# Patient Record
Sex: MALE | Race: BLACK OR AFRICAN AMERICAN | NOT HISPANIC OR LATINO | Employment: FULL TIME
[De-identification: names, ages, dates, MRNs, and addresses within clinical notes are randomized per-mention and may not be internally consistent; named-entity substitution may affect disease eponyms.]

---

## 2017-01-06 ENCOUNTER — RECORDS - HEALTHEAST (OUTPATIENT)
Dept: ADMINISTRATIVE | Facility: OTHER | Age: 47
End: 2017-01-06

## 2017-02-06 ENCOUNTER — RECORDS - HEALTHEAST (OUTPATIENT)
Dept: ADMINISTRATIVE | Facility: OTHER | Age: 47
End: 2017-02-06

## 2017-02-17 ENCOUNTER — OFFICE VISIT - HEALTHEAST (OUTPATIENT)
Dept: FAMILY MEDICINE | Facility: CLINIC | Age: 47
End: 2017-02-17

## 2017-02-17 DIAGNOSIS — F41.9 ANXIETY DISORDER, UNSPECIFIED: ICD-10-CM

## 2017-03-08 ENCOUNTER — OFFICE VISIT - HEALTHEAST (OUTPATIENT)
Dept: FAMILY MEDICINE | Facility: CLINIC | Age: 47
End: 2017-03-08

## 2017-03-08 DIAGNOSIS — M25.552 LEFT HIP PAIN: ICD-10-CM

## 2017-03-08 DIAGNOSIS — Z00.00 ROUTINE GENERAL MEDICAL EXAMINATION AT A HEALTH CARE FACILITY: ICD-10-CM

## 2017-03-08 DIAGNOSIS — F90.0 ADHD, PREDOMINANTLY INATTENTIVE TYPE: ICD-10-CM

## 2017-03-08 LAB
CHOLEST SERPL-MCNC: 178 MG/DL
FASTING STATUS PATIENT QL REPORTED: YES
HDLC SERPL-MCNC: 50 MG/DL
LDLC SERPL CALC-MCNC: 111 MG/DL
PSA SERPL-MCNC: 0.8 NG/ML (ref 0–2.5)
TRIGL SERPL-MCNC: 84 MG/DL

## 2017-03-08 ASSESSMENT — MIFFLIN-ST. JEOR: SCORE: 1753.6

## 2017-03-24 ENCOUNTER — RECORDS - HEALTHEAST (OUTPATIENT)
Dept: ADMINISTRATIVE | Facility: OTHER | Age: 47
End: 2017-03-24

## 2017-03-27 ENCOUNTER — RECORDS - HEALTHEAST (OUTPATIENT)
Dept: ADMINISTRATIVE | Facility: OTHER | Age: 47
End: 2017-03-27

## 2017-04-11 ENCOUNTER — COMMUNICATION - HEALTHEAST (OUTPATIENT)
Dept: FAMILY MEDICINE | Facility: CLINIC | Age: 47
End: 2017-04-11

## 2017-04-11 DIAGNOSIS — F90.0 ADHD, PREDOMINANTLY INATTENTIVE TYPE: ICD-10-CM

## 2017-04-12 ENCOUNTER — COMMUNICATION - HEALTHEAST (OUTPATIENT)
Dept: FAMILY MEDICINE | Facility: CLINIC | Age: 47
End: 2017-04-12

## 2017-05-01 ENCOUNTER — RECORDS - HEALTHEAST (OUTPATIENT)
Dept: ADMINISTRATIVE | Facility: OTHER | Age: 47
End: 2017-05-01

## 2017-05-09 ENCOUNTER — COMMUNICATION - HEALTHEAST (OUTPATIENT)
Dept: FAMILY MEDICINE | Facility: CLINIC | Age: 47
End: 2017-05-09

## 2017-05-09 ENCOUNTER — AMBULATORY - HEALTHEAST (OUTPATIENT)
Dept: FAMILY MEDICINE | Facility: CLINIC | Age: 47
End: 2017-05-09

## 2017-05-09 DIAGNOSIS — F41.9 ANXIETY DISORDER, UNSPECIFIED: ICD-10-CM

## 2017-05-09 DIAGNOSIS — F90.0 ADHD, PREDOMINANTLY INATTENTIVE TYPE: ICD-10-CM

## 2017-05-16 ENCOUNTER — RECORDS - HEALTHEAST (OUTPATIENT)
Dept: ADMINISTRATIVE | Facility: OTHER | Age: 47
End: 2017-05-16

## 2017-06-06 ENCOUNTER — COMMUNICATION - HEALTHEAST (OUTPATIENT)
Dept: FAMILY MEDICINE | Facility: CLINIC | Age: 47
End: 2017-06-06

## 2017-06-06 DIAGNOSIS — F90.0 ADHD, PREDOMINANTLY INATTENTIVE TYPE: ICD-10-CM

## 2017-06-09 ENCOUNTER — COMMUNICATION - HEALTHEAST (OUTPATIENT)
Dept: FAMILY MEDICINE | Facility: CLINIC | Age: 47
End: 2017-06-09

## 2017-06-09 DIAGNOSIS — F90.0 ADHD, PREDOMINANTLY INATTENTIVE TYPE: ICD-10-CM

## 2017-06-12 ENCOUNTER — RECORDS - HEALTHEAST (OUTPATIENT)
Dept: ADMINISTRATIVE | Facility: OTHER | Age: 47
End: 2017-06-12

## 2017-07-03 ENCOUNTER — COMMUNICATION - HEALTHEAST (OUTPATIENT)
Dept: FAMILY MEDICINE | Facility: CLINIC | Age: 47
End: 2017-07-03

## 2017-07-03 DIAGNOSIS — F90.0 ADHD, PREDOMINANTLY INATTENTIVE TYPE: ICD-10-CM

## 2017-07-31 ENCOUNTER — RECORDS - HEALTHEAST (OUTPATIENT)
Dept: ADMINISTRATIVE | Facility: OTHER | Age: 47
End: 2017-07-31

## 2017-08-01 ENCOUNTER — COMMUNICATION - HEALTHEAST (OUTPATIENT)
Dept: FAMILY MEDICINE | Facility: CLINIC | Age: 47
End: 2017-08-01

## 2017-08-01 DIAGNOSIS — F90.0 ADHD, PREDOMINANTLY INATTENTIVE TYPE: ICD-10-CM

## 2017-08-29 ENCOUNTER — COMMUNICATION - HEALTHEAST (OUTPATIENT)
Dept: FAMILY MEDICINE | Facility: CLINIC | Age: 47
End: 2017-08-29

## 2017-08-29 DIAGNOSIS — F90.0 ADHD, PREDOMINANTLY INATTENTIVE TYPE: ICD-10-CM

## 2017-09-28 ENCOUNTER — COMMUNICATION - HEALTHEAST (OUTPATIENT)
Dept: FAMILY MEDICINE | Facility: CLINIC | Age: 47
End: 2017-09-28

## 2017-09-28 DIAGNOSIS — F41.9 ANXIETY DISORDER, UNSPECIFIED: ICD-10-CM

## 2017-09-28 DIAGNOSIS — F90.0 ADHD, PREDOMINANTLY INATTENTIVE TYPE: ICD-10-CM

## 2017-10-31 ENCOUNTER — RECORDS - HEALTHEAST (OUTPATIENT)
Dept: ADMINISTRATIVE | Facility: OTHER | Age: 47
End: 2017-10-31

## 2017-10-31 ENCOUNTER — COMMUNICATION - HEALTHEAST (OUTPATIENT)
Dept: FAMILY MEDICINE | Facility: CLINIC | Age: 47
End: 2017-10-31

## 2017-10-31 DIAGNOSIS — F90.0 ADHD, PREDOMINANTLY INATTENTIVE TYPE: ICD-10-CM

## 2017-11-14 ENCOUNTER — OFFICE VISIT - HEALTHEAST (OUTPATIENT)
Dept: FAMILY MEDICINE | Facility: CLINIC | Age: 47
End: 2017-11-14

## 2017-11-14 DIAGNOSIS — F19.982 DRUG INDUCED INSOMNIA (H): ICD-10-CM

## 2017-11-14 DIAGNOSIS — F90.0 ADHD, PREDOMINANTLY INATTENTIVE TYPE: ICD-10-CM

## 2017-11-28 ENCOUNTER — COMMUNICATION - HEALTHEAST (OUTPATIENT)
Dept: FAMILY MEDICINE | Facility: CLINIC | Age: 47
End: 2017-11-28

## 2017-11-28 DIAGNOSIS — F34.1 DYSTHYMIC DISORDER: ICD-10-CM

## 2017-11-28 DIAGNOSIS — F90.0 ADHD, PREDOMINANTLY INATTENTIVE TYPE: ICD-10-CM

## 2017-11-30 ENCOUNTER — COMMUNICATION - HEALTHEAST (OUTPATIENT)
Dept: FAMILY MEDICINE | Facility: CLINIC | Age: 47
End: 2017-11-30

## 2017-11-30 DIAGNOSIS — F41.9 ANXIETY DISORDER: ICD-10-CM

## 2018-01-03 ENCOUNTER — COMMUNICATION - HEALTHEAST (OUTPATIENT)
Dept: FAMILY MEDICINE | Facility: CLINIC | Age: 48
End: 2018-01-03

## 2018-01-03 DIAGNOSIS — F90.0 ADHD, PREDOMINANTLY INATTENTIVE TYPE: ICD-10-CM

## 2018-02-06 ENCOUNTER — COMMUNICATION - HEALTHEAST (OUTPATIENT)
Dept: FAMILY MEDICINE | Facility: CLINIC | Age: 48
End: 2018-02-06

## 2018-02-06 DIAGNOSIS — F90.0 ADHD, PREDOMINANTLY INATTENTIVE TYPE: ICD-10-CM

## 2018-02-07 ENCOUNTER — COMMUNICATION - HEALTHEAST (OUTPATIENT)
Dept: FAMILY MEDICINE | Facility: CLINIC | Age: 48
End: 2018-02-07

## 2018-02-07 DIAGNOSIS — F41.9 ANXIETY DISORDER: ICD-10-CM

## 2018-03-02 ENCOUNTER — COMMUNICATION - HEALTHEAST (OUTPATIENT)
Dept: FAMILY MEDICINE | Facility: CLINIC | Age: 48
End: 2018-03-02

## 2018-03-02 DIAGNOSIS — F90.0 ADHD, PREDOMINANTLY INATTENTIVE TYPE: ICD-10-CM

## 2018-03-12 ENCOUNTER — COMMUNICATION - HEALTHEAST (OUTPATIENT)
Dept: FAMILY MEDICINE | Facility: CLINIC | Age: 48
End: 2018-03-12

## 2018-03-12 DIAGNOSIS — F34.1 DYSTHYMIC DISORDER: ICD-10-CM

## 2018-03-30 ENCOUNTER — COMMUNICATION - HEALTHEAST (OUTPATIENT)
Dept: FAMILY MEDICINE | Facility: CLINIC | Age: 48
End: 2018-03-30

## 2018-03-30 DIAGNOSIS — F90.0 ADHD, PREDOMINANTLY INATTENTIVE TYPE: ICD-10-CM

## 2018-04-02 ENCOUNTER — OFFICE VISIT - HEALTHEAST (OUTPATIENT)
Dept: FAMILY MEDICINE | Facility: CLINIC | Age: 48
End: 2018-04-02

## 2018-04-02 DIAGNOSIS — K29.70 GASTRITIS: ICD-10-CM

## 2018-04-02 DIAGNOSIS — F90.0 ADHD, PREDOMINANTLY INATTENTIVE TYPE: ICD-10-CM

## 2018-04-02 DIAGNOSIS — B00.9 HERPES SIMPLEX VIRUS (HSV) INFECTION: ICD-10-CM

## 2018-04-02 DIAGNOSIS — F41.9 ANXIETY DISORDER, UNSPECIFIED TYPE: ICD-10-CM

## 2018-04-17 ENCOUNTER — COMMUNICATION - HEALTHEAST (OUTPATIENT)
Dept: FAMILY MEDICINE | Facility: CLINIC | Age: 48
End: 2018-04-17

## 2018-04-17 DIAGNOSIS — F34.1 DYSTHYMIC DISORDER: ICD-10-CM

## 2018-04-26 ENCOUNTER — COMMUNICATION - HEALTHEAST (OUTPATIENT)
Dept: FAMILY MEDICINE | Facility: CLINIC | Age: 48
End: 2018-04-26

## 2018-04-26 DIAGNOSIS — F90.0 ADHD, PREDOMINANTLY INATTENTIVE TYPE: ICD-10-CM

## 2018-05-08 ENCOUNTER — RECORDS - HEALTHEAST (OUTPATIENT)
Dept: ADMINISTRATIVE | Facility: OTHER | Age: 48
End: 2018-05-08

## 2018-05-29 ENCOUNTER — COMMUNICATION - HEALTHEAST (OUTPATIENT)
Dept: FAMILY MEDICINE | Facility: CLINIC | Age: 48
End: 2018-05-29

## 2018-05-29 DIAGNOSIS — F90.0 ADHD, PREDOMINANTLY INATTENTIVE TYPE: ICD-10-CM

## 2018-05-29 DIAGNOSIS — F34.1 DYSTHYMIC DISORDER: ICD-10-CM

## 2018-06-26 ENCOUNTER — COMMUNICATION - HEALTHEAST (OUTPATIENT)
Dept: FAMILY MEDICINE | Facility: CLINIC | Age: 48
End: 2018-06-26

## 2018-06-26 DIAGNOSIS — F90.0 ADHD, PREDOMINANTLY INATTENTIVE TYPE: ICD-10-CM

## 2018-08-01 ENCOUNTER — COMMUNICATION - HEALTHEAST (OUTPATIENT)
Dept: FAMILY MEDICINE | Facility: CLINIC | Age: 48
End: 2018-08-01

## 2018-08-01 DIAGNOSIS — F90.0 ADHD, PREDOMINANTLY INATTENTIVE TYPE: ICD-10-CM

## 2018-08-02 ENCOUNTER — COMMUNICATION - HEALTHEAST (OUTPATIENT)
Dept: FAMILY MEDICINE | Facility: CLINIC | Age: 48
End: 2018-08-02

## 2018-08-02 DIAGNOSIS — F90.0 ADHD, PREDOMINANTLY INATTENTIVE TYPE: ICD-10-CM

## 2018-08-28 ENCOUNTER — COMMUNICATION - HEALTHEAST (OUTPATIENT)
Dept: FAMILY MEDICINE | Facility: CLINIC | Age: 48
End: 2018-08-28

## 2018-08-28 DIAGNOSIS — F90.0 ADHD, PREDOMINANTLY INATTENTIVE TYPE: ICD-10-CM

## 2018-08-28 DIAGNOSIS — F34.1 DYSTHYMIC DISORDER: ICD-10-CM

## 2018-09-26 ENCOUNTER — COMMUNICATION - HEALTHEAST (OUTPATIENT)
Dept: FAMILY MEDICINE | Facility: CLINIC | Age: 48
End: 2018-09-26

## 2018-09-26 DIAGNOSIS — F90.0 ADHD, PREDOMINANTLY INATTENTIVE TYPE: ICD-10-CM

## 2018-10-29 ENCOUNTER — COMMUNICATION - HEALTHEAST (OUTPATIENT)
Dept: FAMILY MEDICINE | Facility: CLINIC | Age: 48
End: 2018-10-29

## 2018-10-29 DIAGNOSIS — F90.0 ADHD, PREDOMINANTLY INATTENTIVE TYPE: ICD-10-CM

## 2018-11-28 ENCOUNTER — COMMUNICATION - HEALTHEAST (OUTPATIENT)
Dept: FAMILY MEDICINE | Facility: CLINIC | Age: 48
End: 2018-11-28

## 2018-11-28 DIAGNOSIS — F90.0 ADHD, PREDOMINANTLY INATTENTIVE TYPE: ICD-10-CM

## 2019-01-09 ENCOUNTER — OFFICE VISIT - HEALTHEAST (OUTPATIENT)
Dept: FAMILY MEDICINE | Facility: CLINIC | Age: 49
End: 2019-01-09

## 2019-01-09 DIAGNOSIS — Z00.00 ROUTINE GENERAL MEDICAL EXAMINATION AT A HEALTH CARE FACILITY: ICD-10-CM

## 2019-01-09 DIAGNOSIS — F32.0 MILD MAJOR DEPRESSION (H): ICD-10-CM

## 2019-01-09 DIAGNOSIS — F90.0 ADHD, PREDOMINANTLY INATTENTIVE TYPE: ICD-10-CM

## 2019-01-09 LAB
ALBUMIN SERPL-MCNC: 3.9 G/DL (ref 3.5–5)
ALP SERPL-CCNC: 86 U/L (ref 45–120)
ALT SERPL W P-5'-P-CCNC: 34 U/L (ref 0–45)
ANION GAP SERPL CALCULATED.3IONS-SCNC: 7 MMOL/L (ref 5–18)
AST SERPL W P-5'-P-CCNC: 23 U/L (ref 0–40)
BILIRUB SERPL-MCNC: 0.7 MG/DL (ref 0–1)
BUN SERPL-MCNC: 14 MG/DL (ref 8–22)
CALCIUM SERPL-MCNC: 9.5 MG/DL (ref 8.5–10.5)
CHLORIDE BLD-SCNC: 104 MMOL/L (ref 98–107)
CHOLEST SERPL-MCNC: 189 MG/DL
CO2 SERPL-SCNC: 30 MMOL/L (ref 22–31)
CREAT SERPL-MCNC: 1.52 MG/DL (ref 0.7–1.3)
FASTING STATUS PATIENT QL REPORTED: YES
GFR SERPL CREATININE-BSD FRML MDRD: 49 ML/MIN/1.73M2
GLUCOSE BLD-MCNC: 97 MG/DL (ref 70–125)
HDLC SERPL-MCNC: 54 MG/DL
LDLC SERPL CALC-MCNC: 117 MG/DL
POTASSIUM BLD-SCNC: 4.4 MMOL/L (ref 3.5–5)
PROT SERPL-MCNC: 6.9 G/DL (ref 6–8)
PSA SERPL-MCNC: 0.6 NG/ML (ref 0–2.5)
SODIUM SERPL-SCNC: 141 MMOL/L (ref 136–145)
TRIGL SERPL-MCNC: 91 MG/DL

## 2019-01-09 ASSESSMENT — MIFFLIN-ST. JEOR: SCORE: 1749.64

## 2019-02-11 ENCOUNTER — COMMUNICATION - HEALTHEAST (OUTPATIENT)
Dept: FAMILY MEDICINE | Facility: CLINIC | Age: 49
End: 2019-02-11

## 2019-02-11 DIAGNOSIS — F90.0 ADHD, PREDOMINANTLY INATTENTIVE TYPE: ICD-10-CM

## 2019-02-14 ENCOUNTER — COMMUNICATION - HEALTHEAST (OUTPATIENT)
Dept: FAMILY MEDICINE | Facility: CLINIC | Age: 49
End: 2019-02-14

## 2019-02-14 DIAGNOSIS — F90.0 ADHD, PREDOMINANTLY INATTENTIVE TYPE: ICD-10-CM

## 2019-03-11 ENCOUNTER — COMMUNICATION - HEALTHEAST (OUTPATIENT)
Dept: FAMILY MEDICINE | Facility: CLINIC | Age: 49
End: 2019-03-11

## 2019-03-11 DIAGNOSIS — F90.0 ADHD, PREDOMINANTLY INATTENTIVE TYPE: ICD-10-CM

## 2019-04-08 ENCOUNTER — COMMUNICATION - HEALTHEAST (OUTPATIENT)
Dept: FAMILY MEDICINE | Facility: CLINIC | Age: 49
End: 2019-04-08

## 2019-04-08 DIAGNOSIS — F90.0 ADHD, PREDOMINANTLY INATTENTIVE TYPE: ICD-10-CM

## 2019-05-08 ENCOUNTER — COMMUNICATION - HEALTHEAST (OUTPATIENT)
Dept: SCHEDULING | Facility: CLINIC | Age: 49
End: 2019-05-08

## 2019-05-08 DIAGNOSIS — F90.0 ADHD, PREDOMINANTLY INATTENTIVE TYPE: ICD-10-CM

## 2019-06-07 ENCOUNTER — COMMUNICATION - HEALTHEAST (OUTPATIENT)
Dept: FAMILY MEDICINE | Facility: CLINIC | Age: 49
End: 2019-06-07

## 2019-06-07 DIAGNOSIS — F90.0 ADHD, PREDOMINANTLY INATTENTIVE TYPE: ICD-10-CM

## 2019-06-18 ENCOUNTER — OFFICE VISIT - HEALTHEAST (OUTPATIENT)
Dept: FAMILY MEDICINE | Facility: CLINIC | Age: 49
End: 2019-06-18

## 2019-06-18 DIAGNOSIS — N52.9 ERECTILE DYSFUNCTION, UNSPECIFIED ERECTILE DYSFUNCTION TYPE: ICD-10-CM

## 2019-06-18 DIAGNOSIS — F90.0 ADHD, PREDOMINANTLY INATTENTIVE TYPE: ICD-10-CM

## 2019-06-18 DIAGNOSIS — B00.9 HERPES SIMPLEX VIRUS (HSV) INFECTION: ICD-10-CM

## 2019-06-18 LAB
AMPHETAMINES UR QL SCN: ABNORMAL
BARBITURATES UR QL: ABNORMAL
BENZODIAZ UR QL: ABNORMAL
CANNABINOIDS UR QL SCN: ABNORMAL
COCAINE UR QL: ABNORMAL
CREAT UR-MCNC: 341 MG/DL
METHADONE UR QL SCN: ABNORMAL
OPIATES UR QL SCN: ABNORMAL
OXYCODONE UR QL: ABNORMAL
PCP UR QL SCN: ABNORMAL

## 2019-06-19 LAB
HSV SPECIMEN: NORMAL
HSV1 DNA SPEC QL NAA+PROBE: NEGATIVE
HSV2 DNA SPEC QL NAA+PROBE: NEGATIVE

## 2019-07-08 ENCOUNTER — COMMUNICATION - HEALTHEAST (OUTPATIENT)
Dept: FAMILY MEDICINE | Facility: CLINIC | Age: 49
End: 2019-07-08

## 2019-07-08 DIAGNOSIS — B00.9 HERPES SIMPLEX VIRUS (HSV) INFECTION: ICD-10-CM

## 2019-07-08 DIAGNOSIS — F90.0 ADHD, PREDOMINANTLY INATTENTIVE TYPE: ICD-10-CM

## 2019-08-05 ENCOUNTER — COMMUNICATION - HEALTHEAST (OUTPATIENT)
Dept: SCHEDULING | Facility: CLINIC | Age: 49
End: 2019-08-05

## 2019-08-05 DIAGNOSIS — F90.0 ADHD, PREDOMINANTLY INATTENTIVE TYPE: ICD-10-CM

## 2019-09-04 ENCOUNTER — COMMUNICATION - HEALTHEAST (OUTPATIENT)
Dept: FAMILY MEDICINE | Facility: CLINIC | Age: 49
End: 2019-09-04

## 2019-09-04 DIAGNOSIS — F90.0 ADHD, PREDOMINANTLY INATTENTIVE TYPE: ICD-10-CM

## 2019-10-04 ENCOUNTER — COMMUNICATION - HEALTHEAST (OUTPATIENT)
Dept: FAMILY MEDICINE | Facility: CLINIC | Age: 49
End: 2019-10-04

## 2019-10-04 DIAGNOSIS — F90.0 ADHD, PREDOMINANTLY INATTENTIVE TYPE: ICD-10-CM

## 2019-11-08 ENCOUNTER — COMMUNICATION - HEALTHEAST (OUTPATIENT)
Dept: FAMILY MEDICINE | Facility: CLINIC | Age: 49
End: 2019-11-08

## 2019-11-08 DIAGNOSIS — F90.0 ADHD, PREDOMINANTLY INATTENTIVE TYPE: ICD-10-CM

## 2019-12-02 ENCOUNTER — COMMUNICATION - HEALTHEAST (OUTPATIENT)
Dept: SCHEDULING | Facility: CLINIC | Age: 49
End: 2019-12-02

## 2019-12-02 DIAGNOSIS — F90.0 ADHD, PREDOMINANTLY INATTENTIVE TYPE: ICD-10-CM

## 2020-01-03 ENCOUNTER — COMMUNICATION - HEALTHEAST (OUTPATIENT)
Dept: FAMILY MEDICINE | Facility: CLINIC | Age: 50
End: 2020-01-03

## 2020-01-03 DIAGNOSIS — F90.0 ADHD, PREDOMINANTLY INATTENTIVE TYPE: ICD-10-CM

## 2020-01-22 ENCOUNTER — OFFICE VISIT - HEALTHEAST (OUTPATIENT)
Dept: FAMILY MEDICINE | Facility: CLINIC | Age: 50
End: 2020-01-22

## 2020-01-22 DIAGNOSIS — N52.9 ERECTILE DYSFUNCTION, UNSPECIFIED ERECTILE DYSFUNCTION TYPE: ICD-10-CM

## 2020-01-22 DIAGNOSIS — F32.0 MILD MAJOR DEPRESSION (H): ICD-10-CM

## 2020-01-22 DIAGNOSIS — F90.0 ADHD, PREDOMINANTLY INATTENTIVE TYPE: ICD-10-CM

## 2020-02-27 ENCOUNTER — COMMUNICATION - HEALTHEAST (OUTPATIENT)
Dept: FAMILY MEDICINE | Facility: CLINIC | Age: 50
End: 2020-02-27

## 2020-02-27 DIAGNOSIS — F90.0 ADHD, PREDOMINANTLY INATTENTIVE TYPE: ICD-10-CM

## 2020-03-27 ENCOUNTER — COMMUNICATION - HEALTHEAST (OUTPATIENT)
Dept: FAMILY MEDICINE | Facility: CLINIC | Age: 50
End: 2020-03-27

## 2020-03-27 DIAGNOSIS — F90.0 ADHD, PREDOMINANTLY INATTENTIVE TYPE: ICD-10-CM

## 2020-04-27 ENCOUNTER — COMMUNICATION - HEALTHEAST (OUTPATIENT)
Dept: FAMILY MEDICINE | Facility: CLINIC | Age: 50
End: 2020-04-27

## 2020-04-27 DIAGNOSIS — F90.0 ADHD, PREDOMINANTLY INATTENTIVE TYPE: ICD-10-CM

## 2020-04-30 ENCOUNTER — OFFICE VISIT - HEALTHEAST (OUTPATIENT)
Dept: FAMILY MEDICINE | Facility: CLINIC | Age: 50
End: 2020-04-30

## 2020-04-30 DIAGNOSIS — S01.511A LIP LACERATION, INITIAL ENCOUNTER: ICD-10-CM

## 2020-04-30 DIAGNOSIS — S01.81XA FACIAL LACERATION, INITIAL ENCOUNTER: ICD-10-CM

## 2020-05-26 ENCOUNTER — COMMUNICATION - HEALTHEAST (OUTPATIENT)
Dept: FAMILY MEDICINE | Facility: CLINIC | Age: 50
End: 2020-05-26

## 2020-05-26 DIAGNOSIS — F90.0 ADHD, PREDOMINANTLY INATTENTIVE TYPE: ICD-10-CM

## 2020-05-29 ENCOUNTER — OFFICE VISIT - HEALTHEAST (OUTPATIENT)
Dept: FAMILY MEDICINE | Facility: CLINIC | Age: 50
End: 2020-05-29

## 2020-05-29 DIAGNOSIS — N52.9 ERECTILE DYSFUNCTION, UNSPECIFIED ERECTILE DYSFUNCTION TYPE: ICD-10-CM

## 2020-05-29 DIAGNOSIS — R06.83 SNORING: ICD-10-CM

## 2020-05-29 DIAGNOSIS — F90.0 ADHD, PREDOMINANTLY INATTENTIVE TYPE: ICD-10-CM

## 2020-06-25 ENCOUNTER — COMMUNICATION - HEALTHEAST (OUTPATIENT)
Dept: FAMILY MEDICINE | Facility: CLINIC | Age: 50
End: 2020-06-25

## 2020-06-25 DIAGNOSIS — F90.0 ADHD, PREDOMINANTLY INATTENTIVE TYPE: ICD-10-CM

## 2020-07-15 ENCOUNTER — COMMUNICATION - HEALTHEAST (OUTPATIENT)
Dept: FAMILY MEDICINE | Facility: CLINIC | Age: 50
End: 2020-07-15

## 2020-07-15 DIAGNOSIS — N52.9 ERECTILE DYSFUNCTION, UNSPECIFIED ERECTILE DYSFUNCTION TYPE: ICD-10-CM

## 2020-07-15 RX ORDER — SILDENAFIL CITRATE 20 MG/1
20 TABLET ORAL PRN
Qty: 90 TABLET | Refills: 0 | Status: SHIPPED | OUTPATIENT
Start: 2020-07-15 | End: 2022-02-02

## 2020-07-23 ENCOUNTER — COMMUNICATION - HEALTHEAST (OUTPATIENT)
Dept: FAMILY MEDICINE | Facility: CLINIC | Age: 50
End: 2020-07-23

## 2020-07-23 DIAGNOSIS — F90.0 ADHD, PREDOMINANTLY INATTENTIVE TYPE: ICD-10-CM

## 2020-07-24 RX ORDER — DEXTROAMPHETAMINE SACCHARATE, AMPHETAMINE ASPARTATE MONOHYDRATE, DEXTROAMPHETAMINE SULFATE AND AMPHETAMINE SULFATE 7.5; 7.5; 7.5; 7.5 MG/1; MG/1; MG/1; MG/1
30 CAPSULE, EXTENDED RELEASE ORAL DAILY
Qty: 30 CAPSULE | Refills: 0 | Status: SHIPPED | OUTPATIENT
Start: 2020-07-24 | End: 2021-08-16

## 2021-05-27 ENCOUNTER — RECORDS - HEALTHEAST (OUTPATIENT)
Dept: ADMINISTRATIVE | Facility: CLINIC | Age: 51
End: 2021-05-27

## 2021-05-27 NOTE — TELEPHONE ENCOUNTER
Refill Request  Did you contact pharmacy: No  Medication name: dextroamphetamine-amphetamine 30 mg Tab  Requested Prescriptions      No prescriptions requested or ordered in this encounter     Who prescribed the medication: Dr Hodge  Pharmacy Name and Location: [facility] [place]  Is patient out of medication: Yes  Patient notified refills processed in 72 hours:  yes  Okay to leave a detailed message: yes

## 2021-05-27 NOTE — TELEPHONE ENCOUNTER
Controlled Substance Refill Request  Medication:   Requested Prescriptions     Pending Prescriptions Disp Refills     dextroamphetamine-amphetamine 30 mg Tab 30 tablet 0     Sig: Take 30 mg by mouth daily.     Date Last Fill: 3/11/19  Pharmacy: [id number]   Submit electronically to pharmacy  Controlled Substance Agreement on File:   Encounter-Level CSA Scan Date - 03/08/2017:    Scan on 3/8/2017 (below)         Last office visit: 4/2/2018 Michelle Hodge MD    Last physical: 1/9/19 with Dr Ayesha Olea, RN [facility] Triage/Med Refill 4/8/2019 6:13 PM

## 2021-05-28 NOTE — TELEPHONE ENCOUNTER
Controlled Substance Refill Request  Medication:   Requested Prescriptions     Pending Prescriptions Disp Refills     dextroamphetamine-amphetamine 30 mg Tab 30 tablet 0     Sig: Take 30 mg by mouth daily.     Date Last Fill: 4/10/2019  Pharmacy: [facility], [place]  Submit electronically to pharmacy  Controlled Substance Agreement on File:   Encounter-Level CSA Scan Date - 03/08/2017:    Scan on 3/8/2017 (below)         Last office visit:1/9/2019.  Last office visit pertaining to requested medication was 1/9/2019 with PCP Dr KENNEDY Hodge.

## 2021-05-28 NOTE — TELEPHONE ENCOUNTER
Refill request for medication: dextroamphetamine-amphetamine 30 mg   Last visit addressing this medication: 1/9/19  Follow up plan 6  months  Last refill on 4/10/19, quantity #30   CSA completed 1/9/19   checked  05/09/19 Reviewed and appropriate, last refill 4/10/19    Appointment: Not due     Mena Antunez CMA [facility] CMT

## 2021-05-29 NOTE — TELEPHONE ENCOUNTER
Controlled Substance Refill Request  Medication Name:   Requested Prescriptions     Pending Prescriptions Disp Refills     dextroamphetamine-amphetamine 30 mg Tab 30 tablet 0     Sig: Take 30 mg by mouth daily.     Date Last Fill: 5/10/2019  Pharmacy: [facility]      Submit electronically to pharmacy  Controlled Substance Agreement Date Scanned:   Encounter-Level CSA Scan Date - 03/08/2017:    Scan on 3/8/2017 (below)         Last office visit with prescriber/PCP: 4/2/2018 Michelle Hodge MD OR same dept: Visit date not found OR same specialty: 4/2/2018 Michelle Hodge MD  Last physical: 1/9/2019 Last MTM visit: Visit date not found

## 2021-05-29 NOTE — PROGRESS NOTES
ASSESSMENT:  1. ADHD, predominantly inattentive type  - Drug Abuse 1+, Urine  - dextroamphetamine-amphetamine 30 mg Tab; Take 30 mg by mouth daily.  Dispense: 30 tablet; Refill: 0  - dextroamphetamine-amphetamine 30 mg Tab; Take 30 mg by mouth daily.  Dispense: 30 tablet; Refill: 0  - dextroamphetamine-amphetamine (ADDERALL) 5 mg Tab tablet; Take 1 tablet by mouth daily. Additional to 30 mg,  Dispense: 30 tablet; Refill: 0  He is here for his 6 monthly follow-up.  Medications were refilled so that he can get additional 2 months to make it 3 months prescription every refill.  He has signed his contract in the past.  I did add 5 mg of Adderall that he can take addition to the 30 mg to make it 35 mg daily.  He will let me know if that is effective so that we can do that to consistently.  We have also discussed his anxiety and depression, he will continue to see the psychologist, complain of being on any medication at this time but noted that he is doing quite well on his Adderall alone.  We will see him again in 6 months.  2. Herpes Simplex Type I  - Herpes Simplex PCR (not CSF)  - acyclovir (ZOVIRAX) 400 MG tablet; Take 1 tablet (400 mg total) by mouth 3 (three) times a day for 7 days.  Dispense: 21 tablet; Refill: 0  He does have a lesion that appears to be circumferential around the pain is I did take his warfarin but I am not really confident that it is going to show anything.  He has had a positive antibody for herpes and we will go ahead and treat him with medication.  I encouraged him to come as soon as he starts noticing any of those eruptions again.  3. Erectile dysfunction, unspecified erectile dysfunction type  - sildenafil (REVATIO) 20 mg tablet; Take 1 tablet (20 mg total) by mouth as needed.  Dispense: 30 tablet; Refill: 0  He is having erectile dysfunction.  Revatio was prescribed and we reviewed the complications and side effects.    PLAN:  There are no Patient Instructions on file for this  visit.    Orders Placed This Encounter   Procedures     Herpes Simplex PCR (not CSF)     Order Specific Question:   Source:     Answer:   Groin skin     Drug Abuse 1+, Urine     There are no discontinued medications.    No follow-ups on file.      CHIEF COMPLAINT:  Chief Complaint   Patient presents with     ADHD       HISTORY OF PRESENT ILLNESS:  Magen is a 49 y.o. male presenting to the clinic today for follow-up of ADHD.  This is the 6 months of follow-up.  He was seen in January.  Today he does not really have any concerns pertaining to ADHD but thinks that he might benefit from an increase in dose.  He thinks that it wears off a little bit faster than usual.  He also has depression and anxiety for which he has been treated in the past.  At that time he had noted a lot of side effects to the medication that he was using which had included Effexor as well as other SSRIs.  He noted that he is doing well regarding the anxiety and depression.  He does not want to be on any other medication for depression or anxiety because of the effects.  He would like to continue on Adderall at this time.  He did go to see a psychologist and he wants to go back again.  He is also complaining of having some rash.  He thinks that it is herpes, because he was diagnosed with herpes type I with antibody years back.  He noticed some pain and skin eruptions which will also slightly itchy.  It also burns.  This happened about a week or so ago and is actually dried out but still causes some discomfort.  He went to discuss to get it treated.  He is also looking to get some medication for erectile dysfunction.  He noted that he is not having erections, and when he does have erections they are weak.  He is never had it before and is beginning to cause some discomfort for him.    REVIEW OF SYSTEMS:   As noted he is doing well, denied any chest pains or shortness of breath.  Denies having any palpitations he denies any suicidal ideation  homicidal ideation. He noted that he is doing well regarding his social life.  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  That is keeping him busy.  Denies having any other major concerns.  All other systems are negative.    PFSH:  Reviewed, as below.    Social History     Tobacco Use   Smoking Status Never Smoker   Smokeless Tobacco Never Used       Family History   Problem Relation Age of Onset     Diabetes Mother      Dementia Mother      Bipolar disorder Mother      ALS Maternal Grandmother      Diabetes Brother      Bipolar disorder Brother      Diabetes Brother      Asthma Son        Social History     Socioeconomic History     Marital status:      Spouse name: Not on file     Number of children: Not on file     Years of education: Not on file     Highest education level: Not on file   Occupational History     Not on file   Social Needs     Financial resource strain: Not on file     Food insecurity:     Worry: Not on file     Inability: Not on file     Transportation needs:     Medical: Not on file     Non-medical: Not on file   Tobacco Use     Smoking status: Never Smoker     Smokeless tobacco: Never Used   Substance and Sexual Activity     Alcohol use: Yes     Comment: Occasionally     Drug use: No     Sexual activity: Yes     Partners: Female   Lifestyle     Physical activity:     Days per week: Not on file     Minutes per session: Not on file     Stress: Not on file   Relationships     Social connections:     Talks on phone: Not on file     Gets together: Not on file     Attends Oriental orthodox service: Not on file     Active member of club or organization: Not on file     Attends meetings of clubs or organizations: Not on file     Relationship status: Not on file     Intimate partner violence:     Fear of current or ex partner: Not on file     Emotionally abused: Not on file     Physically abused: Not on file     Forced sexual activity: Not on file   Other Topics Concern     Not on file   Social History  Narrative     Not on file       Past Surgical History:   Procedure Laterality Date     KNEE SURGERY         No Known Allergies    Active Ambulatory Problems     Diagnosis Date Noted     Constipation      Spinning Dizziness (Vertigo)      Groin (Inguinal) Pain      Muscle Weakness Generalized      Diarrhea      Colitis      Herpes Simplex Type I      Tinea Versicolor      Dysthymic Disorder      Bone Spur Of The Left Olecranon      Abdominal Pain In The Central Upper Belly (Epigastric)      Abdominal Pain In The Left Lower Belly (LLQ)  01/15/2015     ADHD, predominantly inattentive type 01/15/2015     Decreased Concentrating Ability 01/15/2015     Gastritis 02/09/2015     Onychomycosis 05/20/2015     DVT (deep venous thrombosis), left 10/08/2015     Mild major depression (H) 01/09/2019     Resolved Ambulatory Problems     Diagnosis Date Noted     No Resolved Ambulatory Problems     No Additional Past Medical History       Current Outpatient Medications   Medication Sig Dispense Refill     dextroamphetamine-amphetamine 30 mg Tab Take 30 mg by mouth daily. 30 tablet 0     acyclovir (ZOVIRAX) 400 MG tablet Take 1 tablet (400 mg total) by mouth 3 (three) times a day for 7 days. 21 tablet 0     dextroamphetamine-amphetamine (ADDERALL) 5 mg Tab tablet Take 1 tablet by mouth daily. Additional to 30 mg, 30 tablet 0     [START ON 8/7/2019] dextroamphetamine-amphetamine 30 mg Tab Take 30 mg by mouth daily. 30 tablet 0     [START ON 7/7/2019] dextroamphetamine-amphetamine 30 mg Tab Take 30 mg by mouth daily. 30 tablet 0     sildenafil (REVATIO) 20 mg tablet Take 1 tablet (20 mg total) by mouth as needed. 30 tablet 0     No current facility-administered medications for this visit.        VITALS:  Vitals:    06/18/19 0919   BP: 118/78   Pulse: 77   SpO2: 99%   Weight: 189 lb (85.7 kg)     Wt Readings from Last 3 Encounters:   06/18/19 189 lb (85.7 kg)   01/09/19 198 lb 4 oz (89.9 kg)   07/12/18 195 lb (88.5 kg)     Body mass  index is 27.91 kg/m .    PHYSICAL EXAM:  General Appearance: Alert, cooperative, no distress, appears stated age  HEENT: Pupils are equal and reactive, extraocular motions is nor short   Lungs: Respiration is unlabored.  Heart: He has normal peripheral pulsation.  Abdomen: Soft   Skin: Examination of the penile skin did show a circumferential hyperpigmented ring around the distal follows just proximal to the glans penis.  I did not see any rash but there is some scaliness noted.  He noted mild discomfort with palpation.  Musculoskeletal: Normal range of motion. No joint swelling or deformity.   Neurologic:  Alert and oriented times 3.   Psychiatric: Normal mood and affect.    MEDICATIONS:  Current Outpatient Medications   Medication Sig Dispense Refill     dextroamphetamine-amphetamine 30 mg Tab Take 30 mg by mouth daily. 30 tablet 0     acyclovir (ZOVIRAX) 400 MG tablet Take 1 tablet (400 mg total) by mouth 3 (three) times a day for 7 days. 21 tablet 0     dextroamphetamine-amphetamine (ADDERALL) 5 mg Tab tablet Take 1 tablet by mouth daily. Additional to 30 mg, 30 tablet 0     [START ON 8/7/2019] dextroamphetamine-amphetamine 30 mg Tab Take 30 mg by mouth daily. 30 tablet 0     [START ON 7/7/2019] dextroamphetamine-amphetamine 30 mg Tab Take 30 mg by mouth daily. 30 tablet 0     sildenafil (REVATIO) 20 mg tablet Take 1 tablet (20 mg total) by mouth as needed. 30 tablet 0     No current facility-administered medications for this visit.

## 2021-05-30 VITALS — HEIGHT: 69 IN | WEIGHT: 200 LBS | BODY MASS INDEX: 29.62 KG/M2

## 2021-05-30 VITALS — BODY MASS INDEX: 31.32 KG/M2 | WEIGHT: 206 LBS

## 2021-05-30 NOTE — TELEPHONE ENCOUNTER
Adderall filled yesterday 7/7/19.  Refikll denied.    Valtrex not on current med list so routed to pcp.  Funmilayo Fair, RN, [facility] Nurse Triage/Med Refills RN

## 2021-05-30 NOTE — TELEPHONE ENCOUNTER
Refill Request  Did you contact pharmacy: No  Medication name: dextroamphetamine-amphetamine (ADDERALL) 5 mg Tab tablet  dextroamphetamine-amphetamine 30 mg Tab  acyclovir (ZOVIRAX) 400 MG tablet    Requested Prescriptions      No prescriptions requested or ordered in this encounter     Who prescribed the medication: Dr Hodge  Pharmacy Name and Location: [facility]  Is patient out of medication: Yes  Patient notified refills processed in 72 hours:  yes  Okay to leave a detailed message: yes

## 2021-05-31 VITALS — WEIGHT: 201.8 LBS | BODY MASS INDEX: 30.02 KG/M2

## 2021-05-31 NOTE — TELEPHONE ENCOUNTER
Controlled Substance Refill Request  Medication:   Requested Prescriptions     Pending Prescriptions Disp Refills     dextroamphetamine-amphetamine (ADDERALL) 5 mg Tab tablet 30 tablet 0     Sig: Take 1 tablet by mouth daily. Additional to 30 mg,     dextroamphetamine-amphetamine 30 mg Tab 30 tablet 0     Sig: Take 30 mg by mouth daily.     Date Last Fill: 6/18/19  For the 5 mg  Pharmacy: [place] [id number]   Submit electronically to pharmacy  Controlled Substance Agreement on File:   Encounter-Level CSA Scan Date - 03/08/2017:    Scan on 3/8/2017 (below)         Last office visit: 6/18/2019 Michelle Hodge MD   There appears to be a refill for the 30 mg at the pharmacy. Was unable to call pharmacy to verify.

## 2021-06-01 VITALS — BODY MASS INDEX: 30.18 KG/M2 | WEIGHT: 202.9 LBS

## 2021-06-01 NOTE — TELEPHONE ENCOUNTER
Patient is out of medication.    Controlled Substance Refill Request  Medication Name:   Requested Prescriptions     Pending Prescriptions Disp Refills     dextroamphetamine-amphetamine (ADDERALL) 5 mg Tab tablet 30 tablet 0     Sig: Take 1 tablet by mouth daily. Additional to 30 mg,     dextroamphetamine-amphetamine 30 mg Tab 30 tablet 0     Sig: Take 30 mg by mouth daily.     Date Last Fill: 8.7.2019  Pharmacy: [facility]      Submit electronically to pharmacy  Controlled Substance Agreement Date Scanned:   Encounter-Level CSA Scan Date - 03/08/2017:    Scan on 3/8/2017 (below)         Last office visit with prescriber/PCP: 6/18/2019 Michelle Hodge MD OR same dept: 6/18/2019 Michelle Hodge MD OR same specialty: 6/18/2019 Michelle Hodge MD  Last physical: 1/9/2019 Last MTM visit: Visit date not found

## 2021-06-02 VITALS — BODY MASS INDEX: 29.36 KG/M2 | HEIGHT: 69 IN | WEIGHT: 198.25 LBS

## 2021-06-02 NOTE — TELEPHONE ENCOUNTER
Refill request for medication: 10/4/19  Last visit addressing this medication: 6/18/19  Follow up plan 6  months  Last refill on 9/4/19, quantity #30   CSA completed 1/10/19   checked  10/04/19, last dispensed refill 9/4/19    Appointment: Not due     Jen Meadows, JULISS[facility]

## 2021-06-02 NOTE — TELEPHONE ENCOUNTER
Controlled Substance Refill Request  Medication Name:   Requested Prescriptions     Pending Prescriptions Disp Refills     dextroamphetamine-amphetamine 30 mg Tab 30 tablet 0     Sig: Take 30 mg by mouth daily.     dextroamphetamine-amphetamine (ADDERALL) 5 mg Tab tablet 30 tablet 0     Sig: Take 1 tablet by mouth daily. Additional to 30 mg,     Date Last Fill: 9/4/19  Pharmacy: [id number]      Submit electronically to pharmacy  Controlled Substance Agreement Date Scanned:   Encounter-Level CSA Scan Date - 03/08/2017:    Scan on 3/8/2017       Last office visit with prescriber/PCP: 6/18/2019 Michelle Hodge MD OR same dept: 6/18/2019 Michelle Hodge MD OR same specialty: 6/18/2019 Michelle Hodge MD  Last physical: 1/9/2019 Last MTM visit: Visit date not found

## 2021-06-03 VITALS — BODY MASS INDEX: 27.91 KG/M2 | WEIGHT: 189 LBS

## 2021-06-03 NOTE — TELEPHONE ENCOUNTER
Patient is out of medication requesting to process as soon as possible.  Controlled Substance Refill Request  Medication Name:   Requested Prescriptions     Pending Prescriptions Disp Refills     dextroamphetamine-amphetamine (ADDERALL) 5 mg Tab tablet 30 tablet 0     Sig: Take 1 tablet by mouth daily. Additional to 30 mg,     dextroamphetamine-amphetamine 30 mg Tab 30 tablet 0     Sig: Take 30 mg by mouth daily.   Date Last Fill: 10/04/19  Pharmacy: [id number]      Submit electronically to pharmacy  Controlled Substance Agreement Date Scanned:   Encounter-Level CSA Scan Date - 03/08/2017:    Scan on 3/8/2017       Last office visit with prescriber/PCP: 6/18/2019 Michelle Hodge MD OR same dept: 6/18/2019 Michelle Hodge MD OR same specialty: 6/18/2019 Michelle Hodge MD  Last physical: 1/9/2019 Last MTM visit: Visit date not found

## 2021-06-03 NOTE — TELEPHONE ENCOUNTER
Controlled Substance Refill Request  Medication: dextroamphetamine-amphetamine (ADDERALL) 5 mg Tab tablet dextroamphetamine-amphetamine 30 mg Tab  Requested Prescriptions     Pending Prescriptions Disp Refills     dextroamphetamine-amphetamine (ADDERALL) 5 mg Tab tablet 30 tablet 0     Sig: Take 1 tablet by mouth daily. Additional to 30 mg,     dextroamphetamine-amphetamine 30 mg Tab 30 tablet 0     Sig: Take 30 mg by mouth daily.     Date Last Fill: Pharmacy: 11/8/19  Submit electronically to pharmacy  Controlled Substance Agreement on File:   Encounter-Level CSA Scan Date - 03/08/2017:    Scan on 3/8/2017       Last office visit: Visit date 6/18/2019.

## 2021-06-03 NOTE — TELEPHONE ENCOUNTER
Refill request for medication:   1. dextroamphetamine-amphetamine (ADDERALL) 5 mg Tab tablet    2. dextroamphetamine-amphetamine 30 mg Tab    Last visit addressing this medication: 06/18/2019  Follow up plan 6  months  Last refill on 10/04/2019, quantity #30   CSA completed 01/09/2019   checked  11/08/19, last dispensed refill 10/04/2019    Appointment: Not due     Silvia Kapoor, [facility]

## 2021-06-04 VITALS
HEART RATE: 77 BPM | WEIGHT: 187.31 LBS | DIASTOLIC BLOOD PRESSURE: 84 MMHG | BODY MASS INDEX: 27.66 KG/M2 | TEMPERATURE: 98.1 F | OXYGEN SATURATION: 98 % | RESPIRATION RATE: 20 BRPM | SYSTOLIC BLOOD PRESSURE: 122 MMHG

## 2021-06-04 VITALS
OXYGEN SATURATION: 99 % | SYSTOLIC BLOOD PRESSURE: 106 MMHG | DIASTOLIC BLOOD PRESSURE: 70 MMHG | HEART RATE: 65 BPM | BODY MASS INDEX: 26.88 KG/M2 | WEIGHT: 182 LBS

## 2021-06-04 NOTE — TELEPHONE ENCOUNTER
Controlled Substance Refill Request  Medication Name:   Requested Prescriptions     Pending Prescriptions Disp Refills     dextroamphetamine-amphetamine (ADDERALL) 5 mg Tab tablet 30 tablet 0     Sig: Take 1 tablet by mouth daily. Additional to 30 mg,     dextroamphetamine-amphetamine 30 mg Tab 30 tablet 0     Sig: Take 30 mg by mouth daily.     Date Last Fill: 12.5.2019  Pharmacy: [place] [id number]      Submit electronically to pharmacy  Controlled Substance Agreement Date Scanned:   Encounter-Level CSA Scan Date - 03/08/2017:    Scan on 3/8/2017       Last office visit with prescriber/PCP: 6/18/2019 Michelle Hodge MD OR same dept: 6/18/2019 Michelle Hodge MD OR same specialty: 6/18/2019 Michelle Hodge MD  Last physical: 1/9/2019 Last MTM visit: Visit date not found

## 2021-06-05 NOTE — TELEPHONE ENCOUNTER
Patient Returning Call  Reason for call:  Calling back on status of refill  Information relayed to patient:  Informed the caller that the request is open for review with the PCP.  Patient has additional questions:  Yes  If YES, what are your questions/concerns:  Caller states that the patient is out of this medication and would appreciate review ASAP today please  Okay to leave a detailed message?: No call back needed

## 2021-06-05 NOTE — TELEPHONE ENCOUNTER
Refill request for medication: adderal 5 and 30  Last visit addressing this medication: 06/18/19  Follow up plan 6  months  Last refill on 12/07/19 , quantity #30   CSA completed 01/09/19   checked  01/07/20, last dispensed refill 12/07/19    Appointment: pt did set up appt for 01/22/2020     Lashanda Hidalgo, [facility]

## 2021-06-05 NOTE — TELEPHONE ENCOUNTER
1. Refill request for medication: dextroamphetamine-amphetamine 30 mg Tab  Last visit addressing this medication: 06/18/2019  Follow up plan 6  months  Last refill on 12/2/2019, quantity #30   CSA completed 01/09/19   checked  01/07/20, last dispensed refill 12/07/2019      2. Refill request for medication: dextroamphetamine-amphetamine (ADDERALL) 5 mg Tab tablet  Last visit addressing this medication: 06/18/2019  Follow up plan 6  months  Last refill on 12/2/2019, quantity #30   CSA completed 01/09/19   checked  01/07/20, last dispensed refill 12/07/2019    Appointment: No appointments scheduled at this time.     Silvia Kapoor, CMA
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
## 2021-06-06 NOTE — TELEPHONE ENCOUNTER
Refill request for medication: dextroamphetamine-amphetamine (ADDERALL XR) 30 MG 24 hr capsule  Last visit addressing this medication: 01/22/2020  Follow up plan 3  months  Last refill on dextroamphetamine-amphetamine (ADDERALL XR) 30 MG 24 hr capsule, quantity #30   CSA completed 01/09/2019   checked  02/27/20, last dispensed refill   01/30/2020  Appointment: Not due     Silvia Kapoor, [facility]

## 2021-06-06 NOTE — TELEPHONE ENCOUNTER
Refill Request  Did you contact pharmacy: Yes  Medication name:Adderall  Requested Prescriptions      No prescriptions requested or ordered in this encounter     Who prescribed the medication: RICARDA Hodge  Requested Pharmacy: [facility] in [place]  Is patient out of medication: Yes  Patient notified refills processed in 3 business days:  Yes, but needs ASAP  Okay to leave a detailed message: Yes

## 2021-06-06 NOTE — TELEPHONE ENCOUNTER
Controlled Substance Refill Request  Medication Name:   Requested Prescriptions     Pending Prescriptions Disp Refills     dextroamphetamine-amphetamine (ADDERALL XR) 30 MG 24 hr capsule 30 capsule 0     Sig: Take 1 capsule (30 mg total) by mouth daily.     Date Last Fill: 1/22/20  Requested [facility]: [facility]  Submit electronically to pharmacy  Controlled Substance Agreement on file:   Encounter-Level CSA Scan Date - 03/08/2017:    Scan on 3/8/2017       Last office visit:  1/22/20

## 2021-06-07 NOTE — TELEPHONE ENCOUNTER
Patient states he have only two days of supply requesting to process as soon as possible.   Controlled Substance Refill Request  Medication Name:   Requested Prescriptions     Pending Prescriptions Disp Refills     dextroamphetamine-amphetamine (ADDERALL XR) 30 MG 24 hr capsule 30 capsule 0     Sig: Take 1 capsule (30 mg total) by mouth daily.   Date Last Fill: 03/27/20  Is patient out of medication?:  Yes  Patient notified refills processed within 3 business days:  Yes  [facility]ed [id number]  Submit electronically to pharmacy  Controlled Substance Agreement on file:   Encounter-Level CSA Scan Date - 03/08/2017:    Scan on 3/8/2017        Last office visit:  01/22/20      
Refill request for medication: dextroamphetamine-amphetamine (ADDERALL XR) 30 MG 24 hr capsule  Last visit addressing this medication: 01/22/2020  Follow up plan 3  months  Last refill on 03/27/2020, quantity #30   CSA completed 01/09/2019   checked  04/27/20, last dispensed refill 03/27/2020    Appointment: No appointment scheduled at this time     Silvia Kapoor, [facility]    
with cane/good balance

## 2021-06-07 NOTE — TELEPHONE ENCOUNTER
Controlled Substance Refill Request  Medication Name:   Requested Prescriptions     Pending Prescriptions Disp Refills     dextroamphetamine-amphetamine (ADDERALL XR) 30 MG 24 hr capsule 30 capsule 0     Sig: Take 1 capsule (30 mg total) by mouth daily.     Date Last Fill: 2/28/20  Is patient out of medication?: No, 4 days left  Patient notified refills processed within 3 business days:  Yes  [facility]  Submit electronically to pharmacy  Controlled Substance Agreement on file:   Encounter-Level CSA Scan Date - 03/08/2017:    Scan on 3/8/2017        Last office visit:  1/22/20

## 2021-06-07 NOTE — TELEPHONE ENCOUNTER
Refill request for medication: dextroamphetamine-amphetamine (ADDERALL XR) 30 MG 24 hr capsule  Last visit addressing this medication: 01/22/2020  Follow up plan 3  months  Last refill on 02/28/2020 , quantity #30   CSA completed 01/09/2019   checked  03/27/20, last dispensed refill 02/28/2020    Appointment: Not due     Silvia Kapoor [facility]

## 2021-06-08 NOTE — PROGRESS NOTES
"Magen Cabral is a 50 y.o. male who is being evaluated via a billable telephone visit.      The patient has been notified of following:     \"This telephone visit will be conducted via a call between you and your physician/provider. We have found that certain health care needs can be provided without the need for a physical exam.  This service lets us provide the care you need with a short phone conversation.  If a prescription is necessary we can send it directly to your pharmacy.  If lab work is needed we can place an order for that and you can then stop by our lab to have the test done at a later time.    Telephone visits are billed at different rates depending on your insurance coverage. During this emergency period, for some insurers they may be billed the same as an in-person visit.  Please reach out to your insurance provider with any questions.    If during the course of the call the physician/provider feels a telephone visit is not appropriate, you will not be charged for this service.\"    Patient has given verbal consent to a Telephone visit? Yes    What phone number would you like to be contacted at? Number in record    Patient would like to receive their AVS by AVS Preference: Pedro.    Additional provider notes:   Called in for medication management. He has ADHD with associated anxiety and had been on Adderall. He did has trial of SSRI for anxiety and depression but did not tolerate them. Seems to be doing better with the current Adderall.initially though he noted starting to have symptoms and feeling as if the medication is no longer effective. He has been very at work. Had a referral for Psych testing for possible PTSD as well as needing to see a Psychiatrist. He has not made those appts.   He does snore , noted having headache when he wakes in the morning and not feeling rested.  Also having reduced Libido and difficulty with erection. Wondering about refill of Revatio.  ROS:  Has no difficulty " falling asleep. No major depression, no suicidal ideation.    Assessment/Plan:  1. ADHD, predominantly inattentive type    2. Snoring  - Ambulatory referral to Sleep Medicine    3. Erectile dysfunction, unspecified erectile dysfunction type  - sildenafil (REVATIO) 20 mg tablet; Take 1 tablet (20 mg total) by mouth as needed.  Dispense: 30 tablet; Refill: 0  Discussed effects of the ADHD medication in view of possible sleep Apnea and we decided to leave off increasing the Adderall dose till he can be tested for for Sleep Apnea.Advised to make sure to follow up for the places he was referred.  Put in a referral to sleep docs for evaluation.      Phone call duration:  12 minutes    Michelle Hodge MD

## 2021-06-08 NOTE — PROGRESS NOTES
"ASSESSMENT:  1. Anxiety disorder, unspecified  - venlafaxine (EFFEXOR XR) 37.5 MG 24 hr capsule; Start with 1 tab daily then increase to 2 tabs daily after 1-2 weeks.  Dispense: 60 capsule; Refill: 2      PLAN:  There are no Patient Instructions on file for this visit.    No orders of the defined types were placed in this encounter.    There are no discontinued medications.    No Follow-up on file.     Prescribed venlafaxine for anxiety; patient can take 37.5 mg venlafaxine for 1 week, and then increase to 75 mg. Follow-up in 6 weeks for anxiety at that time will discuss need for ADHD medications.    CHIEF COMPLAINT:  Chief Complaint   Patient presents with     Follow-up     was on Adderall and citalopram but SE weren't good, attention span/energy levels low       HISTORY OF PRESENT ILLNESS:  Magen is a 46 y.o. male presenting to the clinic today with his wife for a medication follow-up for ADHD and dysthymic disorder.     ADHD: He stopped taking Adderall because of the side effects. He had been having difficulty sleeping. He also stopped taking Adderall because he sometimes doubled the dose to help with concentration, and he was worried that he might become dependent on it. He experienced headaches when he double the dose of Adderall.     Dysthymic Disorder: He had been taking citalopram, but he has not taken it for a couple months because it did not seem to be helping. The highest dose of citalopram he had taken was 15 mg. He notes that citalopram did help him have a little more energy, but it also caused him to have low libido. His wife has noticed that he was more valdivia and irritable when he was on citalopram. He has had increased anxiety for 3 of 4 weeks with \"butterflies in his stomach.\" His ENE-7 today is 20, and his PHQ-9 is 23.     REVIEW OF SYSTEMS:   Comprehensive review of systems negative except as noted above.    PFSH:  Reviewed, as below.     TOBACCO USE:  History   Smoking Status     Never Smoker "   Smokeless Tobacco     Never Used       VITALS:  Vitals:    02/17/17 1625   BP: 112/80   Patient Site: Left Arm   Patient Position: Sitting   Cuff Size: Adult Large   Pulse: 68   Weight: 206 lb (93.4 kg)     Wt Readings from Last 3 Encounters:   02/17/17 206 lb (93.4 kg)   12/28/16 191 lb 6 oz (86.8 kg)   08/19/16 205 lb 4.8 oz (93.1 kg)     Body mass index is 31.32 kg/(m^2).    PHYSICAL EXAM:  General Appearance: Alert, cooperative, no distress, appears stated age  HEENT: Pupils equal, symmetric  Lungs: Respirations unlabored  Neurologic:  Alert and oriented times 3. Normal reflexes. Cranial nerves II-XII intact.   Psychiatric: Normal mood and affect.      ADDITIONAL HISTORY SUMMARIZED (2): None.  DECISION TO OBTAIN EXTRA INFORMATION (1): None.   RADIOLOGY TESTS (1): None.  LABS (1): None.  MEDICINE TESTS (1): None.  INDEPENDENT REVIEW (2 each): None.       The visit lasted a total of 20 minutes face to face with the patient. Over 50% of the time was spent counseling and educating the patient about anxiety and ADHD.    ISunil, am scribing for and in the presence of, Dr. Hodge.    IDr. Hodge, personally performed the services described in this documentation, as scribed by Sunil Villegas in my presence, and it is both accurate and complete.    MEDICATIONS:  Current Outpatient Prescriptions   Medication Sig Dispense Refill     venlafaxine (EFFEXOR XR) 37.5 MG 24 hr capsule Start with 1 tab daily then increase to 2 tabs daily after 1-2 weeks. 60 capsule 2     No current facility-administered medications for this visit.        Total data points: 0

## 2021-06-08 NOTE — TELEPHONE ENCOUNTER
Refill request for medication: dextroamphetamine-amphetamine (ADDERALL XR) 30 MG 24 hr capsule  Last visit addressing this medication: 1/22/2020  Follow up plan 3  months  Last refill on 4/28/2020, quantity #30   CSA completed 1/9/19   checked  05/27/20, last dispensed refill  not working    Appointment: Appointment scheduled for Video 5/29, checking with insurance to see if this will be covered     Karyn Martinez MA

## 2021-06-09 NOTE — TELEPHONE ENCOUNTER
Refill request for medication: dextroamphetamine-amphetamine (ADDERALL XR) 30 MG 24 hr capsule  Last visit addressing this medication: 05/29/2020  Follow up plan Unknown    Last refill on 05/27/2020, quantity #30   CSA completed 01/09/2019   checked  06/25/20, last dispensed refill 05/27/2020    Appointment: No appointments scheduled at this time     Silvia Kapoor, [facility]

## 2021-06-09 NOTE — TELEPHONE ENCOUNTER
Controlled Substance Refill Request  Medication Name:   Requested Prescriptions     Pending Prescriptions Disp Refills     dextroamphetamine-amphetamine (ADDERALL XR) 30 MG 24 hr capsule 30 capsule 0     Sig: Take 1 capsule (30 mg total) by mouth daily.     Date Last Fill: 06/25/20  [facility]  Submit electronically to pharmacy  Controlled Substance Agreement on file:   Encounter-Level CSA Scan Date - 03/08/2017:    Scan on 3/8/2017        Last office visit:

## 2021-06-09 NOTE — TELEPHONE ENCOUNTER
Received a refill request from [facility] pharmacy on [address] for Sildenafil 20mg- 90 day supply.   Last visit was Virtual 5/29/20.  Debra Mccollum LPN

## 2021-06-09 NOTE — TELEPHONE ENCOUNTER
Controlled Substance Refill Request  Medication Name:   Requested Prescriptions     Pending Prescriptions Disp Refills     dextroamphetamine-amphetamine (ADDERALL XR) 30 MG 24 hr capsule 30 capsule 0     Sig: Take 1 capsule (30 mg total) by mouth daily.     Date Last Fill: 5/27/2020  [facility]  Submit electronically to pharmacy  Controlled Substance Agreement on file:   Encounter-Level CSA Scan Date - 03/08/2017:    Scan on 3/8/2017        Last office visit:  5/29/2020

## 2021-06-09 NOTE — PROGRESS NOTES
ASSESSMENT:  1. Routine general medical examination at a health care facility  - Lipid Cascade  - Comprehensive Metabolic Panel  - Thyroid Cascade  - PSA (Prostatic-Specific Antigen), Annual Screen    2. ADHD, predominantly inattentive type  - dextroamphetamine-amphetamine (ADDERALL) 20 mg Tab; Take 20 tablets by mouth 2 (two) times a day.  Dispense: 60 tablet; Refill: 0    3. Left hip pain    PLAN:  There are no Patient Instructions on file for this visit.    Orders Placed This Encounter   Procedures     Lipid Cascade     Order Specific Question:   Fasting is required?     Answer:   Yes     Comprehensive Metabolic Panel     Thyroid Cascade     PSA (Prostatic-Specific Antigen), Annual Screen     There are no discontinued medications.    No Follow-up on file.     Counseled with regard to cancer screening. Encouraged patient to check his testicles and skin for changes and abnormalities. Discussed what patient can expect for colon cancer screening in the future. Discussed symptoms and screening methods of prostate issues; will get a PSA lab as a baseline. Will get labs as part of a routine physical exam. With regard to anxiety, advised patient to take 37.5 mg venlafaxine rather than 75 mg. Restarted patient on Adderall for ADHD, and discussed ADHD medication agreement with patient.     CHIEF COMPLAINT:  Chief Complaint   Patient presents with     Annual Exam     Fasting       HISTORY OF PRESENT ILLNESS:  Magen is a 46 y.o. male presenting to the clinic today for an annual exam and for an anxiety follow-up. He is fasting today. He has not been exercising since he had left knee surgery on 12/30/2016.    Anxiety: He was started on venlafaxine on February 17th. He took 37.5 mg venlafaxine for 1 week and has taken 75 mg daily since then. He notes slight anxiety improvement on the medication. When he does experience anxiety, episodes do not last as long as they used to. The medication makes him feel sleepy; he lies down when  "he comes home. He did not feel as sleepy during the week he was only taking 37.5 mg venlafaxine. He has taken citalopram in the past, but his wife was noticing that he was not acting himself on citalopram. Of note, he has not been taking Adderall, and he has been having problems with concentration. When he took Adderall, he would take it around 5am in the morning. He also had sleeping difficulty when he was on Adderall. The PHQ-9 today is 25, and the ENE-7 is 20.     REVIEW OF SYSTEMS:   He has had left hip pain after injuring it at work in December 2016; he was seen at the [facility] on 12/4/2016, an X-ray was not done, and he was given 3 days of work restrictions. He has not had physical therapy for his back. He has been doing sedentary work for his job since he had left knee surgery on 12/30/2016 (unrelated to hip pain). He has some left knee pain.   He had a groin injury while he was playing basketball a while ago and felt a \"pop\" in his groin; he has a hard lump in his groin area since then; the lump has not caused him any problems; he has not had an X-ray.  He does not snore. He denies neck pain. He denies abdominal pain. He has diarrhea occasionally. All other systems are negative.    PFSH:  Family: He does NOT have a family history of prostate or colon cancer.   Social: He drinks alcohol occasionally. He has used marijuana in the past, but not often or consistently; he last used marijuana about 6 months ago.     Reviewed, as below.    History   Smoking Status     Never Smoker   Smokeless Tobacco     Never Used       Family History   Problem Relation Age of Onset     Diabetes Mother      Dementia Mother      Bipolar disorder Mother      ALS Maternal Grandmother      Diabetes Brother      Bipolar disorder Brother      Diabetes Brother      Asthma Son        Social History     Social History     Marital status:      Spouse name: N/A     Number of children: N/A     Years of education: N/A " "    Occupational History     Not on file.     Social History Main Topics     Smoking status: Never Smoker     Smokeless tobacco: Never Used     Alcohol use Yes      Comment: Occasionally     Drug use: No     Sexual activity: Yes     Partners: Female     Other Topics Concern     Not on file     Social History Narrative       Past Surgical History:   Procedure Laterality Date     KNEE SURGERY         No Known Allergies    Active Ambulatory Problems     Diagnosis Date Noted     Constipation      Spinning Dizziness (Vertigo)      Groin (Inguinal) Pain      Muscle Weakness Generalized      Diarrhea      Colitis      Herpes Simplex Type I      Tinea Versicolor      Dysthymic Disorder      Bone Spur Of The Left Olecranon      Abdominal Pain In The Central Upper Belly (Epigastric)      Abdominal Pain In The Left Lower Belly (LLQ)  01/15/2015     ADHD, predominantly inattentive type 01/15/2015     Decreased Concentrating Ability 01/15/2015     Gastritis 02/09/2015     Onychomycosis 05/20/2015     DVT (deep venous thrombosis), left 10/08/2015     Resolved Ambulatory Problems     Diagnosis Date Noted     No Resolved Ambulatory Problems     No Additional Past Medical History       Current Outpatient Prescriptions   Medication Sig Dispense Refill     venlafaxine (EFFEXOR XR) 37.5 MG 24 hr capsule Start with 1 tab daily then increase to 2 tabs daily after 1-2 weeks. 60 capsule 2     dextroamphetamine-amphetamine (ADDERALL) 20 mg Tab Take 20 tablets by mouth 2 (two) times a day. 60 tablet 0     No current facility-administered medications for this visit.        VITALS:  Vitals:    03/08/17 0759   BP: 90/64   Pulse: 76   Weight: 200 lb (90.7 kg)   Height: 5' 8.75\" (1.746 m)     Wt Readings from Last 3 Encounters:   03/08/17 200 lb (90.7 kg)   02/17/17 206 lb (93.4 kg)   12/28/16 191 lb 6 oz (86.8 kg)     Body mass index is 29.75 kg/(m^2).    PHYSICAL EXAM:  General Appearance: Alert, cooperative, no distress, appears stated " age  Head: Normocephalic, without obvious abnormality, atraumatic  Eyes: Pupils equal, symmetric  Ears: Normal TMs and external ear canals, both ears  Nose: Nares normal, septum midline, mucosa normal, no drainage  Throat: Lips, mucosa, and tongue normal; teeth and gums normal  Neck: Supple, symmetrical, trachea midline, no adenopathy;  thyroid: not enlarged, symmetric, no tenderness/mass/nodules  Lungs: Clear to auscultation bilaterally, respirations unlabored  Heart: Regular rate and rhythm, S1 and S2 normal, no murmur, rub, or gallop  Abdomen: Soft, non-tender, bowel sounds active all four quadrants, no masses, no organomegaly  Musculoskeletal: Normal range of motion. No joint swelling or deformity.   Extremities normal, atraumatic, no cyanosis or edema  Hard area lateral to the pubic bone on the left side, non-tender.   Lymph nodes: Cervical nodes normal  Neurologic:  Alert and oriented times 3. Normal reflexes. Cranial nerves II-XII intact.   Psychiatric: Normal mood and affect.      ADDITIONAL HISTORY SUMMARIZED (2): Reviewed 12/4/2016 note from [facility] regarding back pain.   DECISION TO OBTAIN EXTRA INFORMATION (1): None.   RADIOLOGY TESTS (1): None.  LABS (1): Ordered labs.   MEDICINE TESTS (1): None.  INDEPENDENT REVIEW (2 each): None.       The visit lasted a total of 30 minutes face to face with the patient. Over 50% of the time was spent counseling and educating the patient about anxiety, health maintenance, and overall health.    ISunil, am scribing for and in the presence of, Dr. Hodge.    IDr. Hodge, personally performed the services described in this documentation, as scribed by Sunil Villegas in my presence, and it is both accurate and complete.    MEDICATIONS:  Current Outpatient Prescriptions   Medication Sig Dispense Refill     venlafaxine (EFFEXOR XR) 37.5 MG 24 hr capsule Start with 1 tab daily then increase to 2 tabs daily after 1-2 weeks. 60 capsule 2      dextroamphetamine-amphetamine (ADDERALL) 20 mg Tab Take 20 tablets by mouth 2 (two) times a day. 60 tablet 0     No current facility-administered medications for this visit.        Total data points: 3

## 2021-06-09 NOTE — TELEPHONE ENCOUNTER
Refill request for medication: dextroamphetamine-amphetamine (ADDERALL XR) 30 MG 24 hr capsule  Last visit addressing this medication: 05/29/2020  Follow up plan Unknown  months  Last refill on 06/25/2020, quantity 06/25/2020   CSA completed 01/09/2019   checked  07/23/20, last dispensed refill 06/25/2020    Appointment: No appointments scheduled at this time     Silvia Kapoor, CMA

## 2021-06-14 NOTE — PROGRESS NOTES
ASSESSMENT:  1. Drug induced insomnia  - LORazepam (ATIVAN) 0.5 MG tablet; Take 1 tablet (0.5 mg total) by mouth at bedtime as needed for anxiety.  Dispense: 30 tablet; Refill: 0    2. ADHD, predominantly inattentive type      PLAN:  I think that the symptoms that he is having currently is due to the side effects of the medication that he is taking most likely the dextroamphetamine amphetamine 20 mg is taken twice a day.  That unfortunately makes it difficult for him to sleep, when he has been having the trouble for some months now. I will give him some lorazepam that will help with sleep as well as anxiety.  But we decided to taper down and take him off of the venlafaxine which he does not want to be on anymore.  I did tell him to start taking 75 mg of venlafaxine every other day for about 1 week, and if he is doing well he can go to every 2 days for another 1 week and can increase to every 3 days and if that time he is having no symptoms he can slowly wean off that point.  But if he is having some symptoms he will call to let me know and I will put in for 37.5 mg which he can alternate with the 75 mg and we will taper it off through that.  I also did encourage him to stop taking the Adderall twice a day to take it once a day hopefully that will help him to sleep if not then he can take the Lorazepam.  She will let me know if he continues to have the hallucination and difficulty sleeping at this point.  If that does happen I did consider referring him to see the psychiatrist.  He can follow-up in about 1 month to discuss further changes or management as needed.    No orders of the defined types were placed in this encounter.    Medications Discontinued During This Encounter   Medication Reason     venlafaxine (EFFEXOR-XR) 75 MG 24 hr capsule Ineffective       No Follow-up on file.      CHIEF COMPLAINT:  Chief Complaint   Patient presents with     Follow-up     discuss decreasing medication dose, nose bleedng, not  sleeping well, nausea, HA, hears someone calling his name, fatigued       HISTORY OF PRESENT ILLNESS:  Magen is a 47 y.o. male presenting to the clinic today for medication check.  He is considering beginning of venlafaxine and have him to try to control his anxiety and depression without any medication.  He also is wondering about reducing the amount of Adderall that he takes 20 mg twice a day currently.  He is concerned about having difficulty with sleeping which he thinks is as a result of the medication.  He noted inability to sleep, currently has been falling asleep around 1 AM mostly will have to wake up around 5 AM to be able to go to work.  Noted that sometimes he will still have to workup in the middle of the night unable to sleep.  Noted that his mind is always constantly thinking that he is having some floaters to his chest.  He is also noting that he is having some irritability especially when he puts him in a fog, so he is wondering if he can stop taking the venlafaxine.  Has also had some hallucination with hearing somebody called him especially when he wakes up in the middle of the night and unable to sleep.  This has also happened about once or twice at work.  As noted he takes the Adderall twice a day and does note that that makes him feel unable to fall asleep.  He does note that he is been able to deal with his depression in the past with that medication and he would like to try that again at this point.  Has had some nosebleed especially in the mornings waking up and blowing his nose.  This has been happening since the dry weather.  He denied having any profuse bleeding and does not have any notable upper respiratory tract symptoms.    REVIEW OF SYSTEMS:   He continues to have some depression or anxiety according to him whenever he takes the medications especially the venlafaxine.  He denied any chest pain or shortness of breath.  He does not have any visual hallucination and denies having any  suicidal ideation though he is worried that something bad might happen infrequently to take the medication.  He denied any crying spells.  He does not have any palpitations, no dizziness but has some headaches.  He is also having some nausea and most of them has to do with the medication.  All other systems are negative.    PFSH:  Reviewed, as below.    History   Smoking Status     Never Smoker   Smokeless Tobacco     Never Used       Family History   Problem Relation Age of Onset     Diabetes Mother      Dementia Mother      Bipolar disorder Mother      ALS Maternal Grandmother      Diabetes Brother      Bipolar disorder Brother      Diabetes Brother      Asthma Son        Social History     Social History     Marital status:      Spouse name: N/A     Number of children: N/A     Years of education: N/A     Occupational History     Not on file.     Social History Main Topics     Smoking status: Never Smoker     Smokeless tobacco: Never Used     Alcohol use Yes      Comment: Occasionally     Drug use: No     Sexual activity: Yes     Partners: Female     Other Topics Concern     Not on file     Social History Narrative       Past Surgical History:   Procedure Laterality Date     KNEE SURGERY         No Known Allergies    Active Ambulatory Problems     Diagnosis Date Noted     Constipation      Spinning Dizziness (Vertigo)      Groin (Inguinal) Pain      Muscle Weakness Generalized      Diarrhea      Colitis      Herpes Simplex Type I      Tinea Versicolor      Dysthymic Disorder      Bone Spur Of The Left Olecranon      Abdominal Pain In The Central Upper Belly (Epigastric)      Abdominal Pain In The Left Lower Belly (LLQ)  01/15/2015     ADHD, predominantly inattentive type 01/15/2015     Decreased Concentrating Ability 01/15/2015     Gastritis 02/09/2015     Onychomycosis 05/20/2015     DVT (deep venous thrombosis), left 10/08/2015     Resolved Ambulatory Problems     Diagnosis Date Noted     No Resolved  Ambulatory Problems     No Additional Past Medical History       Current Outpatient Prescriptions   Medication Sig Dispense Refill     dextroamphetamine-amphetamine (ADDERALL) 20 mg Tab Take 20 mg by mouth 2 (two) times a day. 60 tablet 0     LORazepam (ATIVAN) 0.5 MG tablet Take 1 tablet (0.5 mg total) by mouth at bedtime as needed for anxiety. 30 tablet 0     No current facility-administered medications for this visit.        VITALS:  Vitals:    11/14/17 1439   BP: 110/72   Pulse: 76   Weight: 201 lb 12.8 oz (91.5 kg)     Wt Readings from Last 3 Encounters:   11/14/17 201 lb 12.8 oz (91.5 kg)   03/08/17 200 lb (90.7 kg)   02/17/17 206 lb (93.4 kg)     Body mass index is 30.02 kg/(m^2).    PHYSICAL EXAM:  General Appearance: Alert, cooperative, no distress, appears stated age  HEENT: Pupils are equal and reactive, extraocular motions is normal.  Oropharynx is moist.  Neck is supple no notable thyromegaly.  External ears are normal.  Lungs: Clear to auscultation bilaterally, respirations unlabored  Heart: Regular rate and rhythm, S1 and S2 normal, no murmur, rub, or gallop  Abdomen: Soft  Musculoskeletal: Normal range of motion. No joint swelling or deformity.   Neurologic:  Alert and oriented times 3. Normal reflexes. Cranial nerves II-XII intact.   Psychiatric: He does have a good eye contact.  There is mild moodiness to him.  Does not look anxious.    MEDICATIONS:  Current Outpatient Prescriptions   Medication Sig Dispense Refill     dextroamphetamine-amphetamine (ADDERALL) 20 mg Tab Take 20 mg by mouth 2 (two) times a day. 60 tablet 0     LORazepam (ATIVAN) 0.5 MG tablet Take 1 tablet (0.5 mg total) by mouth at bedtime as needed for anxiety. 30 tablet 0     No current facility-administered medications for this visit.

## 2021-06-16 PROBLEM — F32.0 MILD MAJOR DEPRESSION (H): Status: ACTIVE | Noted: 2019-01-09

## 2021-06-17 NOTE — PROGRESS NOTES
ASSESSMENT:  1. ADHD, predominantly inattentive type  - dextroamphetamine-amphetamine (ADDERALL) 10 mg Tab tablet; Take 1 tablet by mouth daily. Additional dose to take early afternoon.  Dispense: 30 tablet; Refill: 0    2. Anxiety disorder, unspecified type    3. Gastritis  - H. pylori Antigen, Stool    4. Herpes Simplex Type I  - acyclovir (ZOVIRAX) 400 MG tablet; Take 1 tablet (400 mg total) by mouth 2 (two) times a day.  Dispense: 60 tablet; Refill: 3      PLAN:  I did go through with him the medications that he is taken at this time.  Reviewed his Adderall and he is currently taking 20 mg of immediate release daily.  Because of the fact that he is doing 2 jobs and needed to be a lot more concentrated in the evening I did give him a prescription for Adderall 10 mg for afternoon use.  I did advise him to take it close enough to the early afternoon and allowed to affect his sleep.  I did encourage him to continue to take the venlafaxine at this point since his ENE 7 score is high.  We discussed the side effects and if he continues to have nausea I can have him given a prescription for Zofran.  I did also have him tested for H. pylori.  He also got a prescription for acyclovir for suppressive therapy.  This was discussed in details and he will let me know if there is no improvement.    Orders Placed This Encounter   Procedures     H. pylori Antigen, Stool     Medications Discontinued During This Encounter   Medication Reason     venlafaxine (EFFEXOR-XR) 75 MG 24 hr capsule Therapy completed     venlafaxine (EFFEXOR-XR) 37.5 MG 24 hr capsule Therapy completed       No Follow-up on file.      CHIEF COMPLAINT:  Chief Complaint   Patient presents with     Follow-up     med check, discuss increasing adderal dose and d/c venlaflexine- stomach pains        HISTORY OF PRESENT ILLNESS:  Magen is a 48 y.o. male presenting to the clinic today for follow-up as well as medication check.  He has a history of ADHD for which he  had been on stimulant medications.  He is being on Adderall 20 mg daily.  This is his current dose which he noted has not been very helpful.  Noted that it does not last as long, and towards the evening when he still has to go to his other job he will be feeling tired and not able to concentrate fully.  He is considering an increase in the dose.  He also has a history of anxiety with depression and has been on venlafaxine.  Last visit he had talked about weaning off of the medication because of what he termed to be side effects.  But then he continues to have a symptoms of anxiety and depression.  Though he noted that he can be able to do with it.  He is currently taking 37.5 mg of venlafaxine daily.  He also has a diagnosis of genital herpes.  Noted about a week ago he did have a reoccurrence of the herpes though at this point the eruption has reduced.  He is also having some gastritis or symptoms and thinks that he may be having a recurrence of his H pylori.  He wants to get that checked for him at this point.  He is currently not taking any prescribed antiacid or PPI but has been doing over-the-counter medications.  He denied having any nausea or vomiting at this point.    REVIEW OF SYSTEMS:   He denied having any headaches, there is no dizziness and there is no notable vertigo.  Denies having any cough or shortness of breath.  There is no rash at this point.  Feels fatigued and tired.  He is currently not having any problems with sleep, continues to feel slightly valdivia as well as anxious.  No suicidal or homicidal ideation.  Denies any abdominal pain does not have any notable constipation or diarrhea.  He does not have any urinary symptoms.  All other systems are negative.    PFSH:  Reviewed, as below.    History   Smoking Status     Never Smoker   Smokeless Tobacco     Never Used       Family History   Problem Relation Age of Onset     Diabetes Mother      Dementia Mother      Bipolar disorder Mother      ALS  Maternal Grandmother      Diabetes Brother      Bipolar disorder Brother      Diabetes Brother      Asthma Son        Social History     Social History     Marital status:      Spouse name: N/A     Number of children: N/A     Years of education: N/A     Occupational History     Not on file.     Social History Main Topics     Smoking status: Never Smoker     Smokeless tobacco: Never Used     Alcohol use Yes      Comment: Occasionally     Drug use: No     Sexual activity: Yes     Partners: Female     Other Topics Concern     Not on file     Social History Narrative       Past Surgical History:   Procedure Laterality Date     KNEE SURGERY         No Known Allergies    Active Ambulatory Problems     Diagnosis Date Noted     Constipation      Spinning Dizziness (Vertigo)      Groin (Inguinal) Pain      Muscle Weakness Generalized      Diarrhea      Colitis      Herpes Simplex Type I      Tinea Versicolor      Dysthymic Disorder      Bone Spur Of The Left Olecranon      Abdominal Pain In The Central Upper Belly (Epigastric)      Abdominal Pain In The Left Lower Belly (LLQ)  01/15/2015     ADHD, predominantly inattentive type 01/15/2015     Decreased Concentrating Ability 01/15/2015     Gastritis 02/09/2015     Onychomycosis 05/20/2015     DVT (deep venous thrombosis), left 10/08/2015     Resolved Ambulatory Problems     Diagnosis Date Noted     No Resolved Ambulatory Problems     No Additional Past Medical History       Current Outpatient Prescriptions   Medication Sig Dispense Refill     dextroamphetamine-amphetamine (ADDERALL) 20 mg Tab Take 20 mg by mouth daily. 30 tablet 0     venlafaxine (EFFEXOR XR) 37.5 MG 24 hr capsule Take 1 capsule (37.5 mg total) by mouth daily. 30 capsule 0     acyclovir (ZOVIRAX) 400 MG tablet Take 1 tablet (400 mg total) by mouth 2 (two) times a day. 60 tablet 3     dextroamphetamine-amphetamine (ADDERALL) 10 mg Tab tablet Take 1 tablet by mouth daily. Additional dose to take early  afternoon. 30 tablet 0     LORazepam (ATIVAN) 0.5 MG tablet Take 1 tablet (0.5 mg total) by mouth at bedtime as needed for anxiety. 30 tablet 0     No current facility-administered medications for this visit.        VITALS:  Vitals:    04/02/18 1346   BP: 108/70   Patient Site: Left Arm   Patient Position: Sitting   Cuff Size: Adult Large   Pulse: 76   Weight: 202 lb 14.4 oz (92 kg)     Wt Readings from Last 3 Encounters:   04/02/18 202 lb 14.4 oz (92 kg)   11/14/17 201 lb 12.8 oz (91.5 kg)   03/08/17 200 lb (90.7 kg)     Body mass index is 30.18 kg/(m^2).    PHYSICAL EXAM:  General Appearance: Alert, cooperative, no distress, appears stated age  HEENT: Pupils are equal and reactive, extraocular motions is normal.   Lungs:Respirations unlabored  Heart: Normal peripheral pulsation noted.  Abdomen: Soft  Musculoskeletal: Normal gait.  Neurologic:  Alert and oriented times 3.   Psychiatric: Normal mood and affect at the visit.    MEDICATIONS:  Current Outpatient Prescriptions   Medication Sig Dispense Refill     dextroamphetamine-amphetamine (ADDERALL) 20 mg Tab Take 20 mg by mouth daily. 30 tablet 0     venlafaxine (EFFEXOR XR) 37.5 MG 24 hr capsule Take 1 capsule (37.5 mg total) by mouth daily. 30 capsule 0     acyclovir (ZOVIRAX) 400 MG tablet Take 1 tablet (400 mg total) by mouth 2 (two) times a day. 60 tablet 3     dextroamphetamine-amphetamine (ADDERALL) 10 mg Tab tablet Take 1 tablet by mouth daily. Additional dose to take early afternoon. 30 tablet 0     LORazepam (ATIVAN) 0.5 MG tablet Take 1 tablet (0.5 mg total) by mouth at bedtime as needed for anxiety. 30 tablet 0     No current facility-administered medications for this visit.

## 2021-06-18 NOTE — PATIENT INSTRUCTIONS - HE
Patient Instructions by Bella Winter PA-C at 4/30/2020 10:30 AM     Author: Bella Winter PA-C Service: -- Author Type: Physician Assistant    Filed: 4/30/2020 10:31 AM Encounter Date: 4/30/2020 Status: Signed    : Bella Winter PA-C (Physician Assistant)       Do salt water gargles a few times each day. Avoid foods that are sharp or spicy until internal cut heals.  Return in 5 days for suture removal, sooner if signs of infection develop (redness, pus, swelling, worsening pain, fever).    Patient Education     Face Laceration: Stitches or Tape  A laceration is a cut through the skin. This will require stitches if it is deep. Minor cuts may be treated with surgical tape.    Home care    Your healthcare provider may prescribe an antibiotic. This is to help prevent infection. Follow all instructions for taking this medicine. Take the medicine every day until it is gone or you are told to stop. You should not have any left over.    The healthcare provider may prescribe medicines for pain. Follow instructions for taking them.    Follow the healthcare providers instructions on how to care for the cut.    Wash your hands with soap and warm water before and after caring for the cut. This helps prevent infection.    If a bandage was applied and it becomes wet or dirty, replace it. Otherwise, leave it in place for the first 24 hours, then change it once a day or as directed.    If stitches were used, clean the wound daily:  ? After removing the bandage, wash the area with soap and water. Use a wet cotton swab to loosen and remove any blood or crust that forms.  ? After cleaning, keep the wound clean and dry. Talk with your healthcare provider before putting any antibiotic ointment on the wound. Reapply a fresh bandage.  ? You may remove the bandage to shower as usual after the first 24 hours, but don't soak the area in water (no swimming) until the sutures are removed.    If surgical  tape was used, keep the area clean and dry. If it becomes wet, blot it dry with a towel.    Most facial skin wounds heal without problems. But an infection sometimes occurs despite proper treatment. Watch for the signs of infection listed below.  Follow-up care  Follow up with your healthcare provider as advised. Be sure to return for removal of the stitches as directed. Ask your provider how long stitches should remain in place. If surgical tape closures were used, you may remove them yourself when your provider recommends if they have not fallen off on their own.  When to seek medical advice  Call your healthcare provider right away if any of these occur:    Wound bleeding not controlled by direct pressure    Signs of infection, including increasing pain in the wound, increasing wound redness or swelling, or pus or bad odor coming from the wound    Fever of 100.4 F (38 C) or higher, or as directed by your healthcare provider    Stitches come apart or fall out or surgical tape falls off before 5 days    Wound edges reopen    Wound changes colors    Numbness around the wound   Date Last Reviewed: 7/1/2017 2000-2017 [employer]. [address] [place] [address]. All rights reserved. This information is not intended as a substitute for professional medical care. Always follow your healthcare professional's instructions.    Patient Education     Lip or Mouth Laceration  A laceration is a cut through the skin. When the cut is on the outside of the lip, it may be closed with stitches. Cuts inside the mouth may be stitched or left open, depending on the size. When stitches are used in the mouth, they are usually the kind that dissolve on their own.  A tetanus shot may be given if you are not current on this vaccine and the object that caused the cut may lead to tetanus.    Home care    If you were given an antibiotic to prevent infection, don't stop taking this medicine until you have finished it  all or the healthcare provider tells you to stop.    The healthcare provider may prescribe medicines for pain. Follow instructions for taking these medicines.     Follow the healthcare providers instructions on how to care for the cut.    Wash your hands with soap and warm water before and after caring for your cut. This helps prevent infection.    If the cut is inside your mouth, clean the wound by rinsing your mouth after each meal and at bedtime with a mixture of equal parts water and hydrogen peroxide. (Don't swallow!) Or you can use a cotton swab to apply hydrogen peroxide directly onto the cut. You may also be prescribed chlorohexidine to swish and spit to keep the wound clean.    Mouth wounds can cause pain when chewing. Soft foods can help with this. If needed, use an over-the-counter numbing solution for pain relief, such as one for teething babies. Put this directly to the wound with a cotton-tip swab or your clean finger.    If the wound is bandaged, leave the original bandage in place for 24 hours. Replace it if it becomes wet or dirty. After 24 hours, change it once a day or as directed.    If the cut is on the outside of the lip and stitches were used, you may shower as usual after the first 24 hours, but don't put your head under water or swim until the sutures are removed. After removing the bandage, wash the area with soap and water. Use a wet cotton swab to loosen and remove any blood or crust that forms. After cleaning, keep the wound clean and dry. Talk with your healthcare provider before applying any antibiotic ointment to the wound. You may apply an adhesive bandage or leave the wound open. Unless told otherwise, stitches on the inside of the mouth will likely dissolve on their own.  Follow-up care  Follow up with your healthcare provider, or as directed. If you have stitches that don't dissolve on their own, return as directed to have them removed.  When to seek medical advice  Call your  healthcare provider right away if any of these occur:    Wound bleeding not controlled by direct pressure    Signs of infection, including increasing pain in the wound, increasing wound redness or swelling, or pus or bad odor coming from the wound    Fever of 100.4 F (38. C) or higher, or as directed by your healthcare provider    Stitches come apart or fall out     Wound edges reopen    Wound changes colors    Numbness around the wound   Date Last Reviewed: 7/1/2017 2000-2017 [employer]. [address] [place] [address]. All rights reserved. This information is not intended as a substitute for professional medical care. Always follow your healthcare professional's instructions.

## 2021-06-18 NOTE — LETTER
Letter by Michelle Hodge MD at      Author: Michelle Hodge MD Service: -- Author Type: --    Filed:  Encounter Date: 1/9/2019 Status: (Other)         [facility] FAMILY MEDICINE/OB  01/09/19    Patient: Magen Cabral  YOB: 1970  Medical Record Number: [id number]  CSN: [id number]                                                                              Non-opioid Controlled Substance Agreement    I understand that my care provider has prescribed a controlled substance to help manage my condition(s). I am taking this medicine to help me function or work. I know this is strong medicine, and that it can cause serious side effects. Controlled substances can be sedating, addicting and may cause a dependency on the drug. They can affect my ability to drive or think, and cause depression. They need to be taken exactly as prescribed. Combining controlled substances with certain medicines or chemicals (such as cocaine, sedatives and tranquilizers, sleeping pills, meth) can be dangerous or even fatal. Also, if I stop controlled substances suddenly, I may have severe withdrawal symptoms.  If not helpful, I may be asked to stop them.    The risks, benefits, and side effects of these medicine(s) were explained to me. I agree that:    1. I will take part in other treatments as advised by my care team. This may be psychiatry or counseling, physical therapy, behavioral therapy, group treatment or a referral to a pain clinic. I will reduce or stop my medicine when my care team tells me to do so.  2. I will take my medicines as prescribed. I will not change the dose or schedule unless my care team tells me to. There will be no refills if I run out early.  I may be contactedwithout warning and asked to complete a urine drug test or pill count at any time.   3. I will keep all my appointments, and understand this is part of the monitoring of controlled substances. My care team may  require an office visit for EVERY controlled substance refill. If I miss appointments or dont follow instructions, my care team may stop my medicine.  4. I will not ask other providers to prescribe controlled substances, and I will not accept controlled substances from other people. If I need another prescribed controlled substance for a new reason, I will tell my care team within 1 business day.  5. I will use one pharmacy to fill all of my controlled substance prescriptions, and it is up to me to make sure that I do not run out of my medicines on weekends or holidays. If my care team is willing to refill my controlled substance prescription without a visit, I must request refills only during office hours, refills may take up to 3 days to process, and it may take up to 5 to 7 days for my medicine to be mailed and ready at my pharmacy. Prescriptions will not be mailed anywhere except my pharmacy.    6. I am responsible for my prescriptions. If the medicine/prescription is lost or stolen, it will not be replaced. I also agree not to share controlled substance medicines with anyone.          [facility] FAMILY MEDICINE/OB  01/09/19  Patient:  Magen Cabral  YOB: 1970  Medical Record Number: [id number]  CSN: [id number]    7. I agree to not use ANY illegal or recreational drugs. This includes marijuana, cocaine, bath salts or other drugs. I agree not to use alcohol unless my care team says I may. I agree to give urine samples whenever asked. If I dont give a urine sample, the care team may stop my medicine.    8. If I enroll in the [place] Medical Marijuana program, I will tell my care team. I will also sign an agreement to share my medical records with my care team.    9. I will bring in my list of medicines (or my medicine bottles) each time I come to the clinic.   10. I will tell my care team right away if I become pregnant or have a new medical problem treated outside of my regular  clinic.  11. I understand that this medicine can affect my thinking and judgment. It may be unsafe for me to drive, use machinery and do dangerous tasks. I will not do any of these things until I know how the medicine affects me. If my dose changes, I will wait to see how it affects me. I will contact my care team if I have concerns about medicine side effects.    I understand that if I do not follow any of the conditions above, my prescriptions or treatment may be stopped.      I agree that my provider, clinic care team, and pharmacy may work with any city, state or federal law enforcement agency that investigates the misuse, sale, or other diversion of my controlled medicine. I will allow my provider to discuss my care with or share a copy of this agreement with any other treating provider, pharmacy or emergency room where I receive care. I agree to give up (waive) any right of privacy or confidentiality with respect to these consents.   I have read this agreement and have asked questions about anything I did not understand.    ___________________________________________________________________________  Patient signature - Date/Time  -Magen Cabral                                      ___________________________________________________________________________  Witness signature                                                                    ___________________________________________________________________________  Provider signature- Michelle Hodge MD

## 2021-06-23 NOTE — TELEPHONE ENCOUNTER
Controlled Substance Refill Request  Medication Name:   Requested Prescriptions     Pending Prescriptions Disp Refills     dextroamphetamine-amphetamine 30 mg Tab 30 tablet 0     Sig: Take 30 mg by mouth daily.     Date Last Fill: 01/09/2019  Pharmacy: [facility] [place]      Submit electronically to pharmacy  Controlled Substance Agreement Date Scanned:   Encounter-Level CSA Scan Date - 03/08/2017:    Scan on 3/8/2017 (below)         Last office visit with prescriber/PCP: 4/2/2018 Michelle Hodge MD OR same dept: 4/2/2018 Michelle Hodge MD OR same specialty: 4/2/2018 Michelle Hodge MD  Last physical: 1/9/2019 Last MTM visit: Visit date not found

## 2021-06-24 NOTE — TELEPHONE ENCOUNTER
Controlled Substance Refill Request  Medication Name:   Requested Prescriptions     Pending Prescriptions Disp Refills     dextroamphetamine-amphetamine 30 mg Tab 30 tablet 0     Sig: Take 30 mg by mouth daily.     Date Last Fill: 1/9/2019  Pharmacy: [id number]      Submit electronically to pharmacy  Controlled Substance Agreement Date Scanned:   Encounter-Level CSA Scan Date - 03/08/2017:    Scan on 3/8/2017 (below)         Last office visit with prescriber/PCP: 4/2/2018 Michelle Hodge MD OR same dept: 4/2/2018 Michelle Hodge MD OR same specialty: 4/2/2018 Michelle Hodge MD  Last physical: 1/9/2019 Last MTM visit: Visit date not found      RX got sent to wrong pharmacy on 2/11/2019.    Please cancel RX at [facility] and re-send to [facility]. Patient is out.

## 2021-06-24 NOTE — TELEPHONE ENCOUNTER
Controlled Substance Refill Request  Medication:   Requested Prescriptions     Pending Prescriptions Disp Refills     dextroamphetamine-amphetamine 30 mg Tab 30 tablet 0     Sig: Take 30 mg by mouth daily.     Date Last Fill: 2/9/19  Pharmacy: [facility]/pharmacy #[id number] - [place], [place] - [address]  [phone]    Submit electronically to pharmacy  Controlled Substance Agreement on File:   Encounter-Level CSA Scan Date - 03/08/2017:    Scan on 3/8/2017 (below)         Last office visit: 4/2/2018 Michelle Hodge MD

## 2021-06-24 NOTE — TELEPHONE ENCOUNTER
Spoke with patient. Advised he would need to contact the [facility] pharmacy as they have notified us that the prescription was dispensed 2/12/19 and [facility] website confirms this data.  Mena Antunez, [facility] CMT

## 2021-06-24 NOTE — TELEPHONE ENCOUNTER
Who is calling:  Patient   Reason for Call:  Patient states he did not  the prescription from [facility] and wants someone to call them again as this is not accurate.   Date of last appointment with primary care: 1/9/19  Has the patient been recently seen:  Yes  Okay to leave a detailed message: Yes

## 2021-06-26 ENCOUNTER — HEALTH MAINTENANCE LETTER (OUTPATIENT)
Age: 51
End: 2021-06-26

## 2021-06-27 NOTE — PROGRESS NOTES
Progress Notes by Michelle Hodge MD at 1/9/2019  9:40 AM     Author: Michelle Hodge MD Service: -- Author Type: Physician    Filed: 1/9/2019  1:23 PM Encounter Date: 1/9/2019 Status: Signed    : Michelle Hodge MD (Physician)       MALE PREVENTATIVE EXAM    Assessment and Plan:       1. Routine general medical examination at a health care facility  - Lipid Cascade  - Comprehensive Metabolic Panel  - PSA, Annual Screen (Prostatic-Specific Antigen)  Discussed his physical exam as well as health maintenance needs.  We will get his labs as noted above as well as a PSA.  He does not have any family history of prostate cancer or any symptoms at this point.  Will inform him of his labs results.  2. Mild major depression (H)  - Ambulatory referral to Psychology  He had stopped taking his venlafaxine at this point.  He stated that he is doing well regarding depression, but with further questioning I think that he does need to possibly have a little bit more evaluation, he definitely does need to have from psychotherapy to help him with issues from the past that is bothersome to him at this point.  There is a significant family history of bipolar which will have a mild at this time.  We will not have any medications at this point since he does not want to do that right now but I will see him within the next 1-2months to recheck on the mood.  3. ADHD, predominantly inattentive type  - dextroamphetamine-amphetamine 30 mg Tab; Take 30 mg by mouth daily.  Dispense: 30 tablet; Refill: 0  He does want to have an increase in the dosage of the Adderall to see if that would last longer.  He has been on 20 mg and takes 10 mg additionally for the afternoon.  The hope is that the 30 mg will last longer.      Next follow up:  No Follow-up on file.    Immunization Review  Adult Imm Review: No immunizations due today  He does not smoke.    I discussed the following with the patient:   Adult  "Healthy Living: Importance of regular exercise  Healthy nutrition  Getting adequate sleep  Stress management        Subjective:   Chief Complaint: Magen Cabral is an 48 y.o. male here for a preventative health visit     HPI: He comes in for routine physical exam.  Last physical was in 2017.  He does have a history of depression for which he has been on venlafaxine.  He noted feeling a lot better and having some side effects from the venlafaxine which made him to stop taking the medicine.  He was able to successfully wean himself off.  He thinks that he is doing well currently though he still feels that he has like butterflies in his stomach.  He also notes difficulty sleeping sometimes being unable to short of his brain.  He noted increases in his energy intermittently.  But he does not have any known risk behaviors with that.  He notes that feeling in the \"dumps\", notes he still feeling wound up.  Denies any suicidal or homicidal ideation.  He otherwise is feeling well.    Healthy Habits  Are you taking a daily aspirin? No  Do you typically exercising at least 40 min, 3-4 times per week?  Yes  Do you usually eat at least 4 servings of fruit and vegetables a day, include whole grains and fiber and avoid regularly eating high fat foods? Yes  Have you had an eye exam in the past two years? NO  Do you see a dentist twice per year? Yes  Do you have any concerns regarding sleep? YES- having trouble getting to sleep     Safety Screen  If you own firearms, are they secured in a locked gun cabinet or with trigger locks? The patient does not own any firearms  Do you feel you are safe where you are living?: Yes (1/9/2019  9:44 AM)  Do you feel you are safe in your relationship(s)?: Yes (1/9/2019  9:44 AM)      Review of Systems:    Constitutional:Denied any fatigue no fevers no chills.  Has good appetite.  HEENT: Does not have any neck pain.  No difficulty swallowing denies having any postnasal drips.    Respiratory: There " "is no cough.  No chest wall pain.  Cardiovascular: Denied chest pain shortness of breath or palpitations.  There is no notable lower extremity swelling.    Gastrointestinal: Denies nausea vomiting.  No abdominal pain no diarrhea or constipation.  Endocrine:Has no sensitivity to cold or heat.  Denied undue thirst.   Genitourinary:Has no urinary symptoms, no nocturia.  Musculoskeletal: There is no musculoskeletal pain and swelling.    Skin: He does not have any rashes.   Allergic/Immunologic: Negative.   Neurological: No Numbness  Hematological: Negative.   Psychiatric/Behavioral: As in HPI.  Please see above.  The rest of the review of systems are negative for all systems.     Cancer Screening     Patient has no health maintenance due at this time          Patient Care Team:  Michelle Hodge MD as PCP - General        History     Reviewed By Date/Time Sections Reviewed    Lashanda Hidalgo CMA 1/9/2019  9:44 AM Tobacco            Objective:   Vital Signs:   Visit Vitals  /78   Pulse 64   Resp 16   Ht 5' 9\" (1.753 m)   Wt 198 lb 4 oz (89.9 kg)   BMI 29.28 kg/m         Physical Exam:  General Appearance: Alert, cooperative, no distress, appears stated age  Head: Normocephalic, without obvious abnormality, atraumatic  Eyes: PERRL, conjunctiva/corneas clear, EOM's intact  Ears: Normal TM's and external ear canals, both ears  Nose: Nares normal, septum midline,mucosa normal, no drainage  Throat: Lips, mucosa, and tongue normal; teeth and gums normal  Neck: Supple, symmetrical, trachea midline, no adenopathy;  thyroid: not enlarged, symmetric, no tenderness.  Back: Symmetric, no curvature, ROM normal, no CVA tenderness  Lungs: Clear to auscultation bilaterally, respirations unlabored  Heart: Regular rate and rhythm, S1 and S2 normal, no murmur, rub, or gallop,  Abdomen: Soft, non-tender, bowel sounds active all four quadrants,  no masses, no organomegaly  Musculoskeletal: Normal range of motion. No joint " swelling or deformity.   Extremities: Extremities normal, atraumatic, no cyanosis or edema  Skin: Skin color, texture, turgor normal, no rashes or lesions  Lymph nodes: Cervical, supraclavicular, and axillary nodes normal  Neurologic: He is alert. He has normal reflexes.   Psychiatric: He does have good eye contact, he also looks slightly anxious but not valdivia.  There is no signs of kate.           Medication List           Accurate as of 1/9/19 10:47 AM. If you have any questions, ask your nurse or doctor.               START taking these medications    dextroamphetamine-amphetamine 30 mg Tab  INSTRUCTIONS:  Take 30 mg by mouth daily.  Replaces:  dextroamphetamine-amphetamine 20 mg Tab  Started by:  Michelle Hodge MD           STOP taking these medications    acyclovir 400 MG tablet  Also known as:  ZOVIRAX  Stopped by:  Michelle Hodge MD     dextroamphetamine-amphetamine 10 mg Tab tablet  Also known as:  ADDERALL  Stopped by:  Michelle Hodge MD     dextroamphetamine-amphetamine 20 mg Tab  Replaced by:  dextroamphetamine-amphetamine 30 mg Tab  Stopped by:  Michelle Hodge MD     LORazepam 0.5 MG tablet  Also known as:  ATIVAN  Stopped by:  Michelle Hodge MD     venlafaxine 37.5 MG 24 hr capsule  Also known as:  EFFEXOR-XR  Stopped by:  Michelle Hodge MD           Where to Get Your Medications      These medications were sent to [facility]/pharmacy #[id number] - [place], [place] - [address] [facility]. [address] [address]    Phone:  [phone]     dextroamphetamine-amphetamine 30 mg Tab         Additional Screenings Completed Today:

## 2021-06-29 NOTE — PROGRESS NOTES
Progress Notes by Bella Winter PA-C at 4/30/2020 10:30 AM     Author: Bella Winter PA-C Service: -- Author Type: Physician Assistant    Filed: 4/30/2020 11:19 AM Encounter Date: 4/30/2020 Status: Signed    : Bella Winter PA-C (Physician Assistant)       Chief Complaint   Patient presents with   ? Laceration      lower lip x Today     HPI:  Magen Cabral is a 50 y.o. male who complains of laceration to lower lip onset 1 hr ago. Pt works in [email or web address] and a machine got stuck so he pulled it out of the ground, and a piece of steel hit him in the face. Symptoms are moderate in severity & constant in duration. Denies dental pain, HA, fever/chills, purulent drainage, warmth, erythema, or any other symptoms. Last tetanus immunization was in 2014.  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  Social History     Tobacco Use   ? Smoking status: Never Smoker   ? Smokeless tobacco: Never Used   Substance Use Topics   ? Alcohol use: Yes     Comment: Occasionally     Review of Systems   Constitutional: Negative for chills and fever.   Respiratory: Negative for shortness of breath.    Cardiovascular: Negative for chest pain.   Gastrointestinal: Negative for abdominal pain, diarrhea, nausea and vomiting.   Skin: Positive for wound. Negative for rash.   All other systems reviewed and are negative.    Vitals:    04/30/20 1018   BP: 122/84   Patient Site: Right  Arm   Patient Position: Sitting   Cuff Size: Adult Large   Pulse: 77   Resp: 20   Temp: 98.1  F (36.7  C)   TempSrc: Oral   SpO2: 98%   Weight: 187 lb 5 oz (85 kg)     Physical Exam   Constitutional: He appears well-developed and well-nourished.  Non-toxic appearance.   HENT:   Head: Normocephalic and atraumatic.   Mouth/Throat: Normal dentition. Lacerations present.       0.5 cm superficial laceration inside R lower lip; does not appear to be a through and through laceration   Cardiovascular: Normal rate, regular rhythm and normal heart sounds.   Pulmonary/Chest: Effort normal and breath sounds normal.   Neurological: He is alert.   Skin: Skin is warm and dry.   Psychiatric: He has a normal mood and affect.     Labs:  No results found for this or any previous visit (from the past 72 hour(s)).  Radiology:    No results found.    Clinical Decision Making:  LACERATION REPAIR:   Last tetanus booster within 5 years: NO (updated)  Laceration LOCATION: face  Size of laceration: 1 centimeters  Characteristics of the laceration: linear, clean  Wound clean. No Foreign body noted.   Oral consent received. Patient aware of risks which include but are not limited to infection, bleeding, iatrogenic injury. Alternative treatment plan was also discussed.  Copious irrigation with normal saline done. No FBs.  Locally injected with 1% lidocaine with epinephrine at the wound site , good anesthesia achieved.   Laceration was closed using 3 6-0 Ethilon simple interrupted sutures.  Patient tolerated procedure well.   Advised to watch for signs or symptoms of infection. If with any concerns of infection advised to come in immediately to be reassessed. Routine wound care discussed    No sign of dental injury. Laceration repaired as above. Internal lip laceration not in need of repair- wound care instructions, infection precautions discussed. Tetanus updated. Rx antibiotics to prevent infection.    At the end of the encounter, I discussed  results, diagnosis, medications. Discussed red flags for immediate return to clinic/ER, as well as indications for follow up if no improvement. Patient understood and agreed to plan. Patient was stable for discharge.  1. Facial laceration, initial encounter  Nursing communication    Tdap vaccine,  6yo or older,  IM    Laceration repair    lidocaine 1%-EPINEPHrine 1:100,000 1 %-1:100,000 injection 1 mL (XYLOCAINE W/EPI)   2. Lip laceration, initial encounter  amoxicillin-clavulanate (AUGMENTIN) 875-125 mg per tablet     Return in about 5 days (around 5/5/2020) for suture removal.  ?  LEOBARDO Persaud, PA-C  [facility]

## 2021-08-01 ENCOUNTER — E-VISIT (OUTPATIENT)
Dept: URGENT CARE | Facility: CLINIC | Age: 51
End: 2021-08-01
Payer: COMMERCIAL

## 2021-08-01 DIAGNOSIS — Z20.822 SUSPECTED COVID-19 VIRUS INFECTION: Primary | ICD-10-CM

## 2021-08-01 PROCEDURE — 99421 OL DIG E/M SVC 5-10 MIN: CPT | Performed by: PHYSICIAN ASSISTANT

## 2021-08-02 NOTE — PATIENT INSTRUCTIONS
Dear Magen Cabral,    Your symptoms show that you may have coronavirus (COVID-19). This illness can cause fever, cough and trouble breathing. Many people get a mild case and get better on their own. Some people can get very sick.    Will I be tested for COVID-19?  We would like to test you for Covid-19 virus. I have placed orders for this test.     To schedule: go to your [employer] home page and scroll down to the section that says  You have an appointment that needs to be scheduled  and click the large green button that says  Schedule Now  and follow the steps to find the next available openings.    If you are unable to complete these [employer] scheduling steps, please call [phone] to schedule your testing.     Return to work/school/ guidance:  Please let your workplace manager and staffing office know when your quarantine ends     We can t give you an exact date as it depends on the above. You can calculate this on your own or work with your manager/staffing office to calculate this. (For example if you were exposed on 10/4, you would have to quarantine for 14 full days. That would be through 10/18. You could return on 10/19.)      If you receive a positive COVID-19 test result, follow the guidance of the those who are giving you the results. Usually the return to work is 10 (or in some cases 20 days from symptom onset.) If you work at [facility], you must also be cleared by Employee Occupational Health and Safety to return to work.        If you receive a negative COVID-19 test result and did not have a high risk exposure to someone with a known positive COVID-19 test, you can return to work once you're free of fever for 24 hours without fever-reducing medication and your symptoms are improving or resolved.      If you receive a negative COVID-19 test and If you had a high risk exposure to someone who has tested positive for COVID-19 then you can return to work 14 days after your last contact  with the positive individual    Note: If you have ongoing exposure to the covid positive person, this quarantine period may be more than 14 days. (For example, if you are continued to be exposed to your child who tested positive and cannot isolate from them, then the quarantine of 7-14 days can't start until your child is no longer contagious. This is typically 10 days from onset of the child's symptoms. So the total duration may be 17-24 days in this case.)    Sign up for [employer].   We know it's scary to hear that you might have COVID-19. We want to track your symptoms to make sure you're okay over the next 2 weeks. Please look for an email from [employer]--this is a free, online program that we'll use to keep in touch. To sign up, follow the link in the email you will receive. Learn more at [email or web address]    How can I take care of myself?    Get lots of rest. Drink extra fluids (unless a doctor has told you not to)    Take Tylenol (acetaminophen) or ibuprofen for fever or pain. If you have liver or kidney problems, ask your family doctor if it's okay to take Tylenol o ibuprofen    If you have other health problems (like cancer, heart failure, an organ transplant or severe kidney disease): Call your specialty clinic if you don't feel better in the next 2 days.    Know when to call 911. Emergency warning signs include:  o Trouble breathing or shortness of breath  o Pain or pressure in the chest that doesn't go away  o Feeling confused like you haven't felt before, or not being able to wake up  o Bluish-colored lips or face    Where can I get more information?  [facility] - About COVID-19:   [email or web address]    CDC - What to Do If You're Sick:   www.cdc.gov/coronavirus/2019-ncov/about/steps-when-sick.html

## 2021-08-16 ENCOUNTER — OFFICE VISIT (OUTPATIENT)
Dept: FAMILY MEDICINE | Facility: CLINIC | Age: 51
End: 2021-08-16
Payer: COMMERCIAL

## 2021-08-16 VITALS
RESPIRATION RATE: 12 BRPM | HEART RATE: 60 BPM | SYSTOLIC BLOOD PRESSURE: 118 MMHG | OXYGEN SATURATION: 97 % | DIASTOLIC BLOOD PRESSURE: 81 MMHG | BODY MASS INDEX: 29.98 KG/M2 | WEIGHT: 203 LBS | TEMPERATURE: 98.9 F

## 2021-08-16 DIAGNOSIS — S80.869A INSECT BITE OF LOWER LEG, UNSPECIFIED LATERALITY, INITIAL ENCOUNTER: Primary | ICD-10-CM

## 2021-08-16 DIAGNOSIS — R51.9 ACUTE NONINTRACTABLE HEADACHE, UNSPECIFIED HEADACHE TYPE: ICD-10-CM
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
## 2021-08-16 PROCEDURE — 99213 OFFICE O/P EST LOW 20 MIN: CPT | Performed by: PHYSICIAN ASSISTANT

## 2021-08-16 ASSESSMENT — ENCOUNTER SYMPTOMS
WEAKNESS: 0
CHILLS: 0
FEVER: 0
NUMBNESS: 0
HEADACHES: 1

## 2021-08-16 NOTE — PATIENT INSTRUCTIONS
1. Topical Benadryl, or Hydrocortisone, or calamine lotion, or baking soda soaks/paste.   2. Take plenty of hydration, rest, and Tylenol and Ibuprofen for the headache.   3. Your bites may worsen before they get better. Follow up if any bite has pus coming from it, if you develop a fever, or if you have growing redness. Follow up if no improvement in bites after 1 weeks.   4. Follow up if your headache is not improving over the next 48 hours.

## 2021-08-16 NOTE — PROGRESS NOTES
Patient presents with:  Derm Problem: noticed two days ago red dot or rash all on both lower legs poss bug bites       Clinical Decision Making: Patient's insect bites appear consistent with mosquito bites.  Recommend supportive cares.  Reassured him that the headache and insect bites are not likely to be related.  These bites do not appear consistent with tick bite.  Recommend hydration, rest, and over-the-counter pain relief to see if his headache resolved.  Patient will follow up if symptoms persist over the next 48 hours.      ICD-10-CM    1. Insect bite of lower leg, unspecified laterality, initial encounter  S80.869A     W57.XXXA    2. Acute nonintractable headache, unspecified headache type  R51.9        Patient Instructions   1. Topical Benadryl, or Hydrocortisone, or calamine lotion, or baking soda soaks/paste.   2. Take plenty of hydration, rest, and Tylenol and Ibuprofen for the headache.   3. Your bites may worsen before they get better. Follow up if any bite has pus coming from it, if you develop a fever, or if you have growing redness. Follow up if no improvement in bites after 1 weeks.   4. Follow up if your headache is not improving over the next 48 hours.       HPI:  Magen Cabral is a 51 year old male who presents today complaining of concern for possible insect bites that he noted this morning.  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  He also woke up with a right-sided headache today.  He denies any past medical history of migraine disorder.  He denies any neurologic symptoms such as vision changes, weakness, tearing, sweating, or numbness.  He is able to move his neck fully without pain.  He has not taken any medication for his headache.  He rates his headache a 7/10.    History obtained from the patient.    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    Social History     Tobacco Use     Smoking status: Never Smoker     Smokeless tobacco: Never Used   Substance Use Topics     Alcohol use: Yes     Comment: Alcoholic Drinks/day: Occasionally       Review of Systems   Constitutional: Negative for chills and fever.   Eyes: Negative for visual disturbance.   Skin:        Insect bites on lower extremities   Neurological: Positive for headaches. Negative for weakness and numbness.       Vitals:    08/16/21 1431   BP: 118/81   Pulse: 60   Resp: 12   Temp: 98.9  F (37.2  C)   TempSrc: Oral   SpO2: 97%   Weight: 92.1 kg (203 lb)       Physical Exam  Vitals and nursing note reviewed.   Constitutional:       General: He is not in acute distress.     Appearance: He is not toxic-appearing or diaphoretic.   HENT:      Head: Normocephalic and atraumatic.      Right Ear: External ear normal.      Left Ear: External ear normal.   Eyes:      Conjunctiva/sclera: Conjunctivae normal.   Pulmonary:      Effort: Pulmonary effort is normal. No respiratory distress.   Skin:     Comments: Slightly raised, blanching, erythematous insect bites consistent with mosquito bites present on the lower extremities.  No burrowing lines consistent with scabies.   Neurological:      Mental Status: He is alert.   Psychiatric:         Mood and Affect: Mood normal.         Behavior: Behavior normal.         Thought Content: Thought content normal.         Judgment: Judgment normal.         At the end of the encounter, I discussed results, diagnosis,  medications. Discussed red flags for immediate return to clinic/ER, as well as indications for follow up if no improvement. Patient understood and agreed to plan. Patient was stable for discharge.

## 2021-10-16 ENCOUNTER — HEALTH MAINTENANCE LETTER (OUTPATIENT)
Age: 51
End: 2021-10-16

## 2021-10-19 PROBLEM — F32.9 MAJOR DEPRESSION: Status: ACTIVE | Noted: 2019-01-09

## 2022-01-06 ENCOUNTER — E-VISIT (OUTPATIENT)
Dept: URGENT CARE | Facility: URGENT CARE | Age: 52
End: 2022-01-06
Payer: COMMERCIAL

## 2022-01-06 DIAGNOSIS — R05.9 COUGH: Primary | ICD-10-CM

## 2022-01-06 PROCEDURE — 99421 OL DIG E/M SVC 5-10 MIN: CPT | Performed by: EMERGENCY MEDICINE

## 2022-02-02 ENCOUNTER — HOSPITAL ENCOUNTER (EMERGENCY)
Facility: CLINIC | Age: 52
Discharge: HOME | End: 2022-02-03
Attending: EMERGENCY MEDICINE | Admitting: EMERGENCY MEDICINE
Payer: COMMERCIAL

## 2022-02-02 DIAGNOSIS — R11.2 NAUSEA VOMITING AND DIARRHEA: ICD-10-CM

## 2022-02-02 DIAGNOSIS — R19.7 NAUSEA VOMITING AND DIARRHEA: ICD-10-CM

## 2022-02-02 LAB
BASOPHILS # BLD AUTO: 0 10E3/UL (ref 0–0.2)
BASOPHILS NFR BLD AUTO: 0 %
EOSINOPHIL # BLD AUTO: 0.3 10E3/UL (ref 0–0.7)
EOSINOPHIL NFR BLD AUTO: 4 %
ERYTHROCYTE [DISTWIDTH] IN BLOOD BY AUTOMATED COUNT: 12 % (ref 10–15)
HCT VFR BLD AUTO: 47.2 % (ref 40–53)
HGB BLD-MCNC: 15.6 G/DL (ref 13.3–17.7)
IMM GRANULOCYTES # BLD: 0 10E3/UL
IMM GRANULOCYTES NFR BLD: 0 %
LYMPHOCYTES # BLD AUTO: 1.1 10E3/UL (ref 0.8–5.3)
LYMPHOCYTES NFR BLD AUTO: 15 %
MCH RBC QN AUTO: 30.6 PG (ref 26.5–33)
MCHC RBC AUTO-ENTMCNC: 33.1 G/DL (ref 31.5–36.5)
MCV RBC AUTO: 93 FL (ref 78–100)
MONOCYTES # BLD AUTO: 0.7 10E3/UL (ref 0–1.3)
MONOCYTES NFR BLD AUTO: 10 %
NEUTROPHILS # BLD AUTO: 5.2 10E3/UL (ref 1.6–8.3)
NEUTROPHILS NFR BLD AUTO: 71 %
NRBC # BLD AUTO: 0 10E3/UL
NRBC BLD AUTO-RTO: 0 /100
PLATELET # BLD AUTO: 228 10E3/UL (ref 150–450)
RBC # BLD AUTO: 5.1 10E6/UL (ref 4.4–5.9)
WBC # BLD AUTO: 7.3 10E3/UL (ref 4–11)

## 2022-02-02 PROCEDURE — 96374 THER/PROPH/DIAG INJ IV PUSH: CPT

## 2022-02-02 PROCEDURE — 258N000003 HC RX IP 258 OP 636: Performed by: EMERGENCY MEDICINE

## 2022-02-02 PROCEDURE — 80053 COMPREHEN METABOLIC PANEL: CPT | Performed by: EMERGENCY MEDICINE

## 2022-02-02 PROCEDURE — 83690 ASSAY OF LIPASE: CPT | Performed by: EMERGENCY MEDICINE

## 2022-02-02 PROCEDURE — C9803 HOPD COVID-19 SPEC COLLECT: HCPCS

## 2022-02-02 PROCEDURE — 99284 EMERGENCY DEPT VISIT MOD MDM: CPT | Mod: 25

## 2022-02-02 PROCEDURE — 96361 HYDRATE IV INFUSION ADD-ON: CPT

## 2022-02-02 PROCEDURE — 36415 COLL VENOUS BLD VENIPUNCTURE: CPT | Performed by: EMERGENCY MEDICINE

## 2022-02-02 PROCEDURE — 96375 TX/PRO/DX INJ NEW DRUG ADDON: CPT

## 2022-02-02 PROCEDURE — 250N000011 HC RX IP 250 OP 636: Performed by: EMERGENCY MEDICINE

## 2022-02-02 PROCEDURE — 85025 COMPLETE CBC W/AUTO DIFF WBC: CPT | Performed by: EMERGENCY MEDICINE

## 2022-02-02 PROCEDURE — 87636 SARSCOV2 & INF A&B AMP PRB: CPT | Performed by: EMERGENCY MEDICINE

## 2022-02-02 PROCEDURE — 250N000009 HC RX 250: Performed by: EMERGENCY MEDICINE

## 2022-02-02 RX ORDER — ONDANSETRON 2 MG/ML
4 INJECTION INTRAMUSCULAR; INTRAVENOUS EVERY 30 MIN PRN
Status: DISCONTINUED | OUTPATIENT
Start: 2022-02-02 | End: 2022-02-03 | Stop reason: HOSPADM

## 2022-02-02 RX ADMIN — ONDANSETRON 4 MG: 2 INJECTION INTRAMUSCULAR; INTRAVENOUS at 23:41

## 2022-02-02 RX ADMIN — FAMOTIDINE 20 MG: 10 INJECTION, SOLUTION INTRAVENOUS at 23:44

## 2022-02-02 RX ADMIN — SODIUM CHLORIDE 1000 ML: 9 INJECTION, SOLUTION INTRAVENOUS at 23:41

## 2022-02-02 ASSESSMENT — ENCOUNTER SYMPTOMS
NAUSEA: 1
VOMITING: 1
BLOOD IN STOOL: 0
DIARRHEA: 1
DIZZINESS: 0
ROS GI COMMENTS: NEGATIVE FOR HEMATEMESIS
ABDOMINAL PAIN: 1

## 2022-02-02 ASSESSMENT — MIFFLIN-ST. JEOR: SCORE: 1757.57

## 2022-02-03 VITALS
OXYGEN SATURATION: 98 % | SYSTOLIC BLOOD PRESSURE: 118 MMHG | RESPIRATION RATE: 20 BRPM | BODY MASS INDEX: 29.62 KG/M2 | HEART RATE: 75 BPM | DIASTOLIC BLOOD PRESSURE: 72 MMHG | HEIGHT: 69 IN | TEMPERATURE: 98.1 F | WEIGHT: 200 LBS

## 2022-02-03 LAB
ALBUMIN SERPL-MCNC: 4.3 G/DL (ref 3.5–5)
ALP SERPL-CCNC: 116 U/L (ref 45–120)
ALT SERPL W P-5'-P-CCNC: 63 U/L (ref 0–45)
ANION GAP SERPL CALCULATED.3IONS-SCNC: 10 MMOL/L (ref 5–18)
AST SERPL W P-5'-P-CCNC: 36 U/L (ref 0–40)
BILIRUB SERPL-MCNC: 0.5 MG/DL (ref 0–1)
BUN SERPL-MCNC: 13 MG/DL (ref 8–22)
CALCIUM SERPL-MCNC: 9 MG/DL (ref 8.5–10.5)
CHLORIDE BLD-SCNC: 106 MMOL/L (ref 98–107)
CO2 SERPL-SCNC: 25 MMOL/L (ref 22–31)
CREAT SERPL-MCNC: 1.55 MG/DL (ref 0.7–1.3)
FLUAV RNA SPEC QL NAA+PROBE: NEGATIVE
FLUBV RNA RESP QL NAA+PROBE: NEGATIVE
GFR SERPL CREATININE-BSD FRML MDRD: 54 ML/MIN/1.73M2
GLUCOSE BLD-MCNC: 113 MG/DL (ref 70–125)
LIPASE SERPL-CCNC: 197 U/L (ref 0–52)
POTASSIUM BLD-SCNC: 3.8 MMOL/L (ref 3.5–5)
PROT SERPL-MCNC: 7.4 G/DL (ref 6–8)
SARS-COV-2 RNA RESP QL NAA+PROBE: NEGATIVE
SODIUM SERPL-SCNC: 141 MMOL/L (ref 136–145)

## 2022-02-03 RX ORDER — FAMOTIDINE 20 MG/1
20 TABLET, FILM COATED ORAL 2 TIMES DAILY
Qty: 30 TABLET | Refills: 0 | Status: SHIPPED | OUTPATIENT
Start: 2022-02-03 | End: 2024-09-19

## 2022-02-03 RX ORDER — ONDANSETRON 4 MG/1
4-8 TABLET, ORALLY DISINTEGRATING ORAL EVERY 8 HOURS PRN
Qty: 20 TABLET | Refills: 0 | Status: SHIPPED | OUTPATIENT
Start: 2022-02-03 | End: 2022-02-06

## 2022-02-03 ASSESSMENT — ENCOUNTER SYMPTOMS
FEVER: 0
DIFFICULTY URINATING: 0
ARTHRALGIAS: 0
HEADACHES: 0
EYE REDNESS: 0
CONFUSION: 0
COLOR CHANGE: 0
NECK STIFFNESS: 0
SHORTNESS OF BREATH: 0

## 2022-02-03 NOTE — ED PROVIDER NOTES
EMERGENCY DEPARTMENT ENCOUNTER     NAME: Magen Cabral   AGE: 51 year old male   YOB: 1970   MRN: [id number]   EVALUATION DATE & TIME: No admission date for patient encounter.   PCP: Michelle Hodge     Chief Complaint   Patient presents with     Abdominal Pain   :    FINAL IMPRESSION       1. Nausea vomiting and diarrhea           ED COURSE & MEDICAL DECISION MAKING      Pertinent Labs & Imaging studies reviewed. (See chart for details)   51 year old male  presents to the Emergency Department for evaluation of 1 day of nausea vomiting and diarrhea, also with some epigastric discomfort. Initial Vitals Reviewed. Initial exam notable for an early well-appearing male with minimal epigastric tenderness, otherwise normal vitals and nonfocal exam.  I suspect a viral illness with both vomiting and diarrhea something like COVID-19 versus influenza versus other viral syndrome.  I did labs to evaluate for dehydration, electrolyte abnormalities and they are basically unremarkable.  His lipase is slightly elevated at 197, but clinically his symptoms are not suggestive of an acute pancreatitis and I do not think he would require admission for this.  He was treated with some IV fluids, Pepcid and Zofran and is feeling improved.  I am going to have him do a clear liquid diet at home until symptoms are improving, and this should help decrease his lipase as well.  Return precautions, home care instructions were discussed.  Covid and influenza testing is pending at time of disposition and is comfortable with discharge.        11:40 PM I met with the patient, obtained history, performed an initial exam, and discussed options and plan for diagnostics and treatment here in the ED.   12:57 AM I rechecked patient. We discussed the plan for discharge and the patient is agreeable. Reviewed supportive cares, symptomatic treatment, outpatient follow up, and reasons to return to the Emergency Department. Patient to be  discharged by ED RN.     At the conclusion of the encounter I discussed the results of all of the tests and the disposition. The questions were answered. The patient or family acknowledged understanding and was agreeable with the care plan.         MEDICATIONS GIVEN IN THE EMERGENCY:   Medications   ondansetron (ZOFRAN) injection 4 mg (4 mg Intravenous Given 2/2/22 2341)   0.9% sodium chloride BOLUS (1,000 mLs Intravenous New Bag 2/2/22 2341)   famotidine (PEPCID) injection 20 mg (20 mg Intravenous Given 2/2/22 2344)      NEW PRESCRIPTIONS STARTED AT TODAY'S ER VISIT   New Prescriptions    FAMOTIDINE (PEPCID) 20 MG TABLET    Take 1 tablet (20 mg) by mouth 2 times daily    ONDANSETRON (ZOFRAN ODT) 4 MG ODT TAB    Take 1-2 tablets (4-8 mg) by mouth every 8 hours as needed for nausea     ================================================================   HISTORY OF PRESENT ILLNESS       Patient information was obtained from: Patient     Use of Intrepreter: N/A      Magen Cabral is a 51 year old male with history of DVT, colitis, gastritis, who presents to the ED via walk-in for the evaluation of abdominal pain.    Patient reports he developed some epigastric abdominal pain, nausea, vomiting, diarrhea, and dizziness this morning, that persisted throughout the day. He notes a history of H. Pylori about 10 years ago and notes that current symptoms feel similar, however abdominal pain is more intense. Otherwise patient denies blood in stool, hematemesis, and recent sick contact. Patient is vaccinated against COVID. No other complaints at this time.    ================================================================    REVIEW OF SYSTEMS       Review of Systems   Constitutional: Negative for fever.   HENT: Negative for congestion.    Eyes: Negative for redness.   Respiratory: Negative for shortness of breath.    Cardiovascular: Negative for chest pain.   Gastrointestinal: Positive for abdominal pain (epigastric), diarrhea,  nausea and vomiting. Negative for blood in stool.        Negative for hematemesis   Genitourinary: Negative for difficulty urinating.   Musculoskeletal: Negative for arthralgias and neck stiffness.   Skin: Negative for color change.   Neurological: Negative for dizziness and headaches.   Psychiatric/Behavioral: Negative for confusion.   All other systems reviewed and are negative.     PAST HISTORY     PAST MEDICAL HISTORY:   History reviewed. No pertinent past medical history.   PAST SURGICAL HISTORY:   Past Surgical History:   Procedure Laterality Date     KNEE SURGERY        CURRENT MEDICATIONS:   famotidine (PEPCID) 20 MG tablet  ondansetron (ZOFRAN ODT) 4 MG ODT tab      ALLERGIES:   No Known Allergies   FAMILY HISTORY:   Family History   Problem Relation Age of Onset     Diabetes Mother      Dementia Mother      Bipolar Disorder Mother      ALS Maternal Grandmother      Diabetes Brother      Bipolar Disorder Brother      Diabetes Brother      Asthma Son       SOCIAL HISTORY:   Social History     Socioeconomic History     Marital status:      Spouse name: Not on file     Number of children: Not on file     Years of education: Not on file     Highest education level: Not on file   Occupational History     Not on file   Tobacco Use     Smoking status: Never Smoker     Smokeless tobacco: Never Used   Substance and Sexual Activity     Alcohol use: Yes     Comment: Alcoholic Drinks/day: Occasionally     Drug use: No     Sexual activity: Yes     Partners: Female   Other Topics Concern     Not on file   Social History Narrative     Not on file     Social Determinants of Health     Financial Resource Strain: Not on file   Food Insecurity: Not on file   Transportation Needs: Not on file   Physical Activity: Not on file   Stress: Not on file   Social Connections: Not on file   Intimate Partner Violence: Not on file   Housing Stability: Not on file        VITALS  Patient Vitals for the past 24 hrs:   BP Temp Temp  "src Pulse Resp SpO2 Height Weight   02/03/22 0030 124/80 -- -- 70 18 99 % -- --   02/03/22 0015 -- -- -- 68 -- 98 % -- --   02/03/22 0000 135/80 -- -- 65 20 99 % -- --   02/02/22 2327 (!) 134/105 97.8  F (36.6  C) Oral 93 -- 96 % 1.753 m (5' 9\") 90.7 kg (200 lb)        ================================================================    PHYSICAL EXAM     VITAL SIGNS: /80   Pulse 70   Temp 97.8  F (36.6  C) (Oral)   Resp 18   Ht 1.753 m (5' 9\")   Wt 90.7 kg (200 lb)   SpO2 99%   BMI 29.53 kg/m     Constitutional:  Awake, no acute distress   HENT:  Atraumatic, oropharynx without exudate or erythema, membranes moist  Lymph:  No adenopathy  Eyes: EOM intact, PERRL, no injection  Neck: Supple  Respiratory:  Clear to auscultation bilaterally, no wheezes or crackles   Cardiovascular:  Regular rate and rhythm, single S1 and S2   GI:  Soft, nondistended, no rebound or guarding. Minimal epigastric tenderness.  Musculoskeletal:  Moves all extremities, no lower extremity edema, no deformities    Skin:  Warm, dry  Neurologic:  Alert and oriented x3, no focal deficits noted       ================================================================  LAB       All pertinent labs reviewed and interpreted.   Labs Ordered and Resulted from Time of ED Arrival to Time of ED Departure   COMPREHENSIVE METABOLIC PANEL - Abnormal       Result Value    Sodium 141      Potassium 3.8      Chloride 106      Carbon Dioxide (CO2) 25      Anion Gap 10      Urea Nitrogen 13      Creatinine 1.55 (*)     Calcium 9.0      Glucose 113      Alkaline Phosphatase 116      AST 36      ALT 63 (*)     Protein Total 7.4      Albumin 4.3      Bilirubin Total 0.5      GFR Estimate 54 (*)    LIPASE - Abnormal    Lipase 197 (*)    CBC WITH PLATELETS AND DIFFERENTIAL    WBC Count 7.3      RBC Count 5.10      Hemoglobin 15.6      Hematocrit 47.2      MCV 93      MCH 30.6      MCHC 33.1      RDW 12.0      Platelet Count 228      % Neutrophils 71      % " Lymphocytes 15      % Monocytes 10      % Eosinophils 4      % Basophils 0      % Immature Granulocytes 0      NRBCs per 100 WBC 0      Absolute Neutrophils 5.2      Absolute Lymphocytes 1.1      Absolute Monocytes 0.7      Absolute Eosinophils 0.3      Absolute Basophils 0.0      Absolute Immature Granulocytes 0.0      Absolute NRBCs 0.0     INFLUENZA A/B & SARS-COV2 PCR MULTIPLEX        ===============================================================  RADIOLOGY       Reviewed all pertinent imaging. Please see official radiology report.   No orders to display         ================================================================  EKG         I have independently reviewed and interpreted the EKG(s) documented above.     ================================================================  PROCEDURES         I, Belia Nick, am serving as a scribe to document services personally performed by Dr. Ellis based on my observation and the provider's statements to me. I, Yuni Ellis MD attest that Belia Nick is acting in a scribe capacity, has observed my performance of the services and has documented them in accordance with my direction.   Yuni Ellis M.D.   Emergency Medicine   [facility] EMERGENCY ROOM  [address] [id number] [phone]  Dept: [phone]      Yuni Ellis MD  02/03/22 0114

## 2022-02-03 NOTE — ED TRIAGE NOTES
Patient present with abdominal pain, nausea, diarrhea, and dizziness that started this morning. Can only keep water down. Pain in upper abdomen is worse when lying down. Chills present, denies fever at home.

## 2022-02-03 NOTE — DISCHARGE INSTRUCTIONS
As we discussed, your labs are generally normal, and your Covid test is still pending. I recommend remaining at home until those results are available which should likely be sometime tomorrow. Focus on small amounts of clear liquids and use the nausea medicine and a stomach medicine to help. If the pain is worsening, you're still vomiting and can't hold down liquids, these would be reasons to come back to the ER. If it hasn't improved and it's been a couple of weeks, then I would recommend seeing your doctor for testing for that H. pylori.

## 2022-07-17 ENCOUNTER — HEALTH MAINTENANCE LETTER (OUTPATIENT)
Age: 52
End: 2022-07-17

## 2022-08-01 ENCOUNTER — OFFICE VISIT (OUTPATIENT)
Dept: FAMILY MEDICINE | Facility: CLINIC | Age: 52
End: 2022-08-01
Payer: COMMERCIAL

## 2022-08-01 VITALS
BODY MASS INDEX: 30.01 KG/M2 | HEART RATE: 58 BPM | SYSTOLIC BLOOD PRESSURE: 118 MMHG | OXYGEN SATURATION: 98 % | DIASTOLIC BLOOD PRESSURE: 88 MMHG | WEIGHT: 198 LBS | HEIGHT: 68 IN

## 2022-08-01 DIAGNOSIS — R73.03 PREDIABETES: ICD-10-CM

## 2022-08-01 DIAGNOSIS — Z00.00 ENCOUNTER FOR PREVENTIVE CARE: Primary | ICD-10-CM

## 2022-08-01 DIAGNOSIS — F33.2 SEVERE EPISODE OF RECURRENT MAJOR DEPRESSIVE DISORDER, WITHOUT PSYCHOTIC FEATURES (H): ICD-10-CM

## 2022-08-01 DIAGNOSIS — Z12.5 SCREENING FOR PROSTATE CANCER: ICD-10-CM

## 2022-08-01 DIAGNOSIS — F90.0 ADHD, PREDOMINANTLY INATTENTIVE TYPE: ICD-10-CM

## 2022-08-01 DIAGNOSIS — R10.13 ABDOMINAL PAIN, EPIGASTRIC: ICD-10-CM

## 2022-08-01 DIAGNOSIS — R74.8 SERUM LIPASE ELEVATION: ICD-10-CM

## 2022-08-01 DIAGNOSIS — Z72.820 LACK OF ADEQUATE SLEEP: ICD-10-CM

## 2022-08-01 DIAGNOSIS — F34.1 DYSTHYMIC DISORDER: ICD-10-CM

## 2022-08-01 DIAGNOSIS — Z86.19 HISTORY OF HELICOBACTER PYLORI INFECTION: ICD-10-CM

## 2022-08-01 LAB
ALBUMIN SERPL BCG-MCNC: 4.6 G/DL (ref 3.5–5.2)
ALP SERPL-CCNC: 124 U/L (ref 40–129)
ALT SERPL W P-5'-P-CCNC: 56 U/L (ref 10–50)
ANION GAP SERPL CALCULATED.3IONS-SCNC: 10 MMOL/L (ref 7–15)
AST SERPL W P-5'-P-CCNC: 39 U/L (ref 10–50)
BILIRUB SERPL-MCNC: 0.4 MG/DL
BUN SERPL-MCNC: 13.6 MG/DL (ref 6–20)
CALCIUM SERPL-MCNC: 9.3 MG/DL (ref 8.6–10)
CHLORIDE SERPL-SCNC: 104 MMOL/L (ref 98–107)
CREAT SERPL-MCNC: 1.38 MG/DL (ref 0.67–1.17)
DEPRECATED HCO3 PLAS-SCNC: 29 MMOL/L (ref 22–29)
ERYTHROCYTE [DISTWIDTH] IN BLOOD BY AUTOMATED COUNT: 11.8 % (ref 10–15)
GFR SERPL CREATININE-BSD FRML MDRD: 62 ML/MIN/1.73M2
GLUCOSE SERPL-MCNC: 92 MG/DL (ref 70–99)
HBA1C MFR BLD: 6.1 % (ref 0–5.6)
HCT VFR BLD AUTO: 43.4 % (ref 40–53)
HGB BLD-MCNC: 14.5 G/DL (ref 13.3–17.7)
MCH RBC QN AUTO: 31 PG (ref 26.5–33)
MCHC RBC AUTO-ENTMCNC: 33.4 G/DL (ref 31.5–36.5)
MCV RBC AUTO: 93 FL (ref 78–100)
PLATELET # BLD AUTO: 217 10E3/UL (ref 150–450)
POTASSIUM SERPL-SCNC: 4.6 MMOL/L (ref 3.4–5.3)
PROT SERPL-MCNC: 7.3 G/DL (ref 6.4–8.3)
RBC # BLD AUTO: 4.67 10E6/UL (ref 4.4–5.9)
SODIUM SERPL-SCNC: 143 MMOL/L (ref 136–145)
WBC # BLD AUTO: 6.1 10E3/UL (ref 4–11)

## 2022-08-01 PROCEDURE — 96127 BRIEF EMOTIONAL/BEHAV ASSMT: CPT | Performed by: NURSE PRACTITIONER

## 2022-08-01 PROCEDURE — 80053 COMPREHEN METABOLIC PANEL: CPT | Performed by: NURSE PRACTITIONER

## 2022-08-01 PROCEDURE — 99214 OFFICE O/P EST MOD 30 MIN: CPT | Mod: 25 | Performed by: NURSE PRACTITIONER

## 2022-08-01 PROCEDURE — G0103 PSA SCREENING: HCPCS | Performed by: NURSE PRACTITIONER

## 2022-08-01 PROCEDURE — 36415 COLL VENOUS BLD VENIPUNCTURE: CPT | Performed by: NURSE PRACTITIONER

## 2022-08-01 PROCEDURE — 87389 HIV-1 AG W/HIV-1&-2 AB AG IA: CPT | Performed by: NURSE PRACTITIONER

## 2022-08-01 PROCEDURE — 85027 COMPLETE CBC AUTOMATED: CPT | Performed by: NURSE PRACTITIONER

## 2022-08-01 PROCEDURE — 83036 HEMOGLOBIN GLYCOSYLATED A1C: CPT | Performed by: NURSE PRACTITIONER

## 2022-08-01 PROCEDURE — 80061 LIPID PANEL: CPT | Performed by: NURSE PRACTITIONER

## 2022-08-01 PROCEDURE — 83690 ASSAY OF LIPASE: CPT | Performed by: NURSE PRACTITIONER

## 2022-08-01 PROCEDURE — 86803 HEPATITIS C AB TEST: CPT | Performed by: NURSE PRACTITIONER

## 2022-08-01 PROCEDURE — 99396 PREV VISIT EST AGE 40-64: CPT | Performed by: NURSE PRACTITIONER

## 2022-08-01 ASSESSMENT — ENCOUNTER SYMPTOMS
NERVOUS/ANXIOUS: 1
SHORTNESS OF BREATH: 1
ARTHRALGIAS: 1
PARESTHESIAS: 0
EYE PAIN: 0
HEMATOCHEZIA: 0
SORE THROAT: 0
CHILLS: 0
DYSURIA: 0
WEAKNESS: 1
CONSTIPATION: 1
HEADACHES: 1
HEMATURIA: 0
FEVER: 0
FREQUENCY: 0
PALPITATIONS: 1
NAUSEA: 1
HEARTBURN: 1
COUGH: 1
JOINT SWELLING: 0
DIZZINESS: 1
MYALGIAS: 1
ABDOMINAL PAIN: 1
DIARRHEA: 1

## 2022-08-01 ASSESSMENT — PATIENT HEALTH QUESTIONNAIRE - PHQ9
SUM OF ALL RESPONSES TO PHQ QUESTIONS 1-9: 21
SUM OF ALL RESPONSES TO PHQ QUESTIONS 1-9: 21
10. IF YOU CHECKED OFF ANY PROBLEMS, HOW DIFFICULT HAVE THESE PROBLEMS MADE IT FOR YOU TO DO YOUR WORK, TAKE CARE OF THINGS AT HOME, OR GET ALONG WITH OTHER PEOPLE: VERY DIFFICULT

## 2022-08-01 NOTE — PROGRESS NOTES
SUBJECTIVE:   CC: Magen Cabral is an 52 year old male who presents for preventative health visit.     Patient has been advised of split billing requirements and indicates understanding: Yes     He is present today to establish care and to discuss his abdominal pain.  He stated for the past year he has been experiencing abdominal pain.  The pain is located in the epigastric area.  The pain can be worse in the morning and will wake him up out of sleep.  The pain is a cramping sensation.  He is not noticing any increase in burping or acid reflux.  He is trying diet changes such as juicing and it does not appear to be helping as much.  He denied any nausea or vomiting.  He denied any increase in weight loss.  He denied any fever or chills.  He denied that the area radiates anywhere.  He denied any changes in his bowel pattern.  He denied any urinary changes. he stated that he can have a cough at night.  He is a non-smoker and does not have a history of asthma.  He does have a runny nose intermittently and has allergies.  He denied any shortness of breath or chest pains.  He stated that he tends to worry a lot at night and has problems staying asleep.  He would like to have a referral for mental health and really would like to stay off medications if he can.  HO ADHA and depression in past. Will notice lack of focus. He denied any suicidal or homicidal plans or thoughts.  He is  and has children.   - He drinks about 1 glass of alcohol every other night.   - drinks one soda per day. Energy drink once a day.     Healthy Habits:     Getting at least 3 servings of Calcium per day:  Yes    Bi-annual eye exam:  Yes    Dental care twice a year:  Yes    Sleep apnea or symptoms of sleep apnea:  Daytime drowsiness and Sleep apnea    Diet:  Regular (no restrictions)    Frequency of exercise:  2-3 days/week    Duration of exercise:  15-30 minutes    Taking medications regularly:  Yes    PHQ-2 Total Score: 6    Additional  concerns today:  Yes      Today's PHQ-2 Score:   PHQ-2 ( 1999 [employer]) 8/1/2022   Q1: Little interest or pleasure in doing things 3   Q2: Feeling down, depressed or hopeless 3   PHQ-2 Score 6   Q1: Little interest or pleasure in doing things Nearly every day   Q2: Feeling down, depressed or hopeless Nearly every day   PHQ-2 Score 6       Abuse: Current or Past(Physical, Sexual or Emotional)- NA  Do you feel safe in your environment? Yes    Have you ever done Advance Care Planning? (For example, a Health Directive, POLST, or a discussion with a medical provider or your loved ones about your wishes): No, advance care planning information given to patient to review.  Patient plans to discuss their wishes with loved ones or provider.      Social History     Tobacco Use     Smoking status: Never Smoker     Smokeless tobacco: Never Used   Substance Use Topics     Alcohol use: Yes     Comment: Alcoholic Drinks/day: Occasionally         Alcohol Use 8/1/2022   Prescreen: >3 drinks/day or >7 drinks/week? No       Last PSA:   Prostate Specific Antigen Screen   Date Value Ref Range Status   01/09/2019 0.6 0.0 - 2.5 ng/mL Final       Reviewed orders with patient. Reviewed health maintenance and updated orders accordingly - Yes  Lab work is in process  Labs reviewed in EPIC    Reviewed and updated as needed this visit by clinical staff   Tobacco  Allergies  Meds                Reviewed and updated as needed this visit by Provider                   No past medical history on file.   Past Surgical History:   Procedure Laterality Date     KNEE SURGERY         Review of Systems   Constitutional: Negative for chills and fever.   HENT: Positive for hearing loss. Negative for congestion, ear pain and sore throat.    Eyes: Positive for visual disturbance. Negative for pain.   Respiratory: Positive for cough and shortness of breath.    Cardiovascular: Positive for chest pain and palpitations. Negative for peripheral edema.  "  Gastrointestinal: Positive for abdominal pain, constipation, diarrhea, heartburn and nausea. Negative for hematochezia.   Genitourinary: Negative for dysuria, frequency, genital sores, hematuria, impotence, penile discharge and urgency.   Musculoskeletal: Positive for arthralgias and myalgias. Negative for joint swelling.   Skin: Negative for rash.   Neurological: Positive for dizziness, weakness and headaches. Negative for paresthesias.   Psychiatric/Behavioral: Positive for mood changes. The patient is nervous/anxious.      CONSTITUTIONAL: NEGATIVE for fever, chills, change in weight  INTEGUMENTARY/SKIN: NEGATIVE for worrisome rashes, moles or lesions  EYES: NEGATIVE for vision changes or irritation  ENT: NEGATIVE for ear, mouth and throat problems  RESP: NEGATIVE for significant cough or SOB  CV: NEGATIVE for chest pain, palpitations or peripheral edema  GI: NEGATIVE for nausea, abdominal pain, heartburn, or change in bowel habits   male: negative for dysuria, hematuria, decreased urinary stream, erectile dysfunction, urethral discharge  MUSCULOSKELETAL: NEGATIVE for significant arthralgias or myalgia  NEURO: NEGATIVE for weakness, dizziness or paresthesias  PSYCHIATRIC: NEGATIVE for changes in mood or affect    OBJECTIVE:   /88   Pulse 58   Ht 1.727 m (5' 8\")   Wt 89.8 kg (198 lb)   SpO2 98%   BMI 30.11 kg/m      Physical Exam  GENERAL: healthy, alert and no distress  EYES: Eyes grossly normal to inspection, PERRL and conjunctivae and sclerae normal  HENT: ear canals and TM's normal, nose and mouth without ulcers or lesions  NECK: no adenopathy, no asymmetry, masses, or scars and thyroid normal to palpation  RESP: lungs clear to auscultation - no rales, rhonchi or wheezes  CV: regular rate and rhythm, normal S1 S2, no S3 or S4, no murmur, click or rub, no peripheral edema and peripheral pulses strong  ABDOMEN: soft, nontender, no hepatosplenomegaly, no masses and bowel sounds normal  MS: no gross " musculoskeletal defects noted, no edema  SKIN: no suspicious lesions or rashes  NEURO: Normal strength and tone, mentation intact and speech normal  PSYCH: mentation appears normal, affect normal/bright    Diagnostic Test Results:  Labs reviewed in Epic    ASSESSMENT/PLAN:       ICD-10-CM    1. Encounter for preventive care  Z00.00 Lipid Profile (Chol, Trig, HDL, LDL calc)     Hepatitis C antibody     HIV Antigen Antibody Combo     Hemoglobin A1c (aka HBA1C)     Lipid Profile (Chol, Trig, HDL, LDL calc)     Hepatitis C antibody     HIV Antigen Antibody Combo     Hemoglobin A1c (aka HBA1C)   2. Dysthymic disorder  F34.1 [facility]   3. Severe episode of recurrent major depressive disorder, without psychotic features (H)  F33.2 Adult Mental [facility] Referral   4. ADHD, predominantly inattentive type  F90.0 Adult Mental [facility] Referral   5. History of Helicobacter pylori infection  Z86.19 Helicobacter pylori Antigen Stool   6. Abdominal Pain In The [place] Upper Belly (Epigastric)  R10.13 Comprehensive metabolic panel (BMP + Alb, Alk Phos, ALT, AST, Total. Bili, TP)     Lipase     CBC with platelets     Comprehensive metabolic panel (BMP + Alb, Alk Phos, ALT, AST, Total. Bili, TP)     Lipase     CBC with platelets   7. Serum lipase elevation  R74.8 CT Abdomen w/o Contrast   8. Prediabetes  R73.03 Hemoglobin A1c (aka HBA1C)   9. Screening for prostate cancer  Z12.5 PSA, screen   10. Lack of adequate sleep  Z72.820      - Elevated Lipase with on-going symptoms of epigastric discomfort. Ct Scan ordered to assess pancreas, gallbladder, etc.. Can be changed to abdomen and pelvis is radiologist decides.   - Marginal AST/ALT elevation but stable. Fatty liver can be one culprit. Ct ordered to assess gallbladder, and pancreas.   - I called him today and discussed labs. Prediabetes noted. Diet and exercise discussed. Suggest he cut down on soda, energy drinks, and ETOH. Recheck lab  "in six months.   - Increased anxiety and depression. He is seeking  referral, order is placed. Conservative management discussed. NO SI or HI noted. I reviewed phq-9. Safety plan discussed. Follow up in 1 months or PRN.   - lack of adequate sleep. Suggest he stop energy drinks and effeminated sodas past noon. Hopefully once anxiety decreases, his sleep will improve. Conservative management discussed.   - cholesterol and ASCVD risk discussed via phone. Recheck in one year. Diet discussed.   - CBC stable.   - kidney stable.     Patient has been advised of split billing requirements and indicates understanding: Yes    COUNSELING:   Reviewed preventive health counseling, as reflected in patient instructions    Estimated body mass index is 30.11 kg/m  as calculated from the following:    Height as of this encounter: 1.727 m (5' 8\").    Weight as of this encounter: 89.8 kg (198 lb).         He reports that he has never smoked. He has never used smokeless tobacco.      Counseling Resources:  ATP IV Guidelines  Pooled Cohorts Equation Calculator  FRAX Risk Assessment  ICSI Preventive Guidelines  Dietary Guidelines for Americans, 2010  [employer]'s MyPlate  ASA Prophylaxis  Lung CA Screening    KIM Robert [facility]  Answers for HPI/ROS submitted by the patient on 8/1/2022  If you checked off any problems, how difficult have these problems made it for you to do your work, take care of things at home, or get along with other people?: Very difficult  PHQ9 TOTAL SCORE: 21      "

## 2022-08-02 ENCOUNTER — TRANSFERRED RECORDS (OUTPATIENT)
Dept: HEALTH INFORMATION MANAGEMENT | Facility: CLINIC | Age: 52
End: 2022-08-02

## 2022-08-02 LAB
CHOLEST SERPL-MCNC: 215 MG/DL
HCV AB SERPL QL IA: NONREACTIVE
HDLC SERPL-MCNC: 61 MG/DL
HIV 1+2 AB+HIV1 P24 AG SERPL QL IA: NONREACTIVE
LDLC SERPL CALC-MCNC: 135 MG/DL
LIPASE SERPL-CCNC: 177 U/L (ref 13–60)
NONHDLC SERPL-MCNC: 154 MG/DL
TRIGL SERPL-MCNC: 95 MG/DL

## 2022-08-03 LAB — PSA SERPL-MCNC: 0.87 NG/ML (ref 0–3.5)

## 2022-08-12 ENCOUNTER — HOSPITAL ENCOUNTER (OUTPATIENT)
Dept: CT IMAGING | Facility: CLINIC | Age: 52
Discharge: HOME | End: 2022-08-12
Attending: NURSE PRACTITIONER | Admitting: NURSE PRACTITIONER
Payer: COMMERCIAL

## 2022-08-12 DIAGNOSIS — R74.8 SERUM LIPASE ELEVATION: ICD-10-CM

## 2022-08-12 PROCEDURE — 74160 CT ABDOMEN W/CONTRAST: CPT

## 2022-08-12 PROCEDURE — 250N000011 HC RX IP 250 OP 636: Performed by: NURSE PRACTITIONER

## 2022-08-12 RX ORDER — IOPAMIDOL 755 MG/ML
100 INJECTION, SOLUTION INTRAVASCULAR ONCE
Status: COMPLETED | OUTPATIENT
Start: 2022-08-12 | End: 2022-08-12

## 2022-08-12 RX ADMIN — IOPAMIDOL 100 ML: 755 INJECTION, SOLUTION INTRAVENOUS at 16:57

## 2022-08-15 NOTE — RESULT ENCOUNTER NOTE
Hi    Your CT of the abdomen is not showing any evidence of acute or chronic pancreatitis.  It is indicating fatty liver disease which is usually treated with diet changes, and decreasing any alcohol consumption.  I think because you are having this epigastric gastric pain chronically, and your lipase is elevated , it would be reasonable to follow-up with a GI specialist.  I will place the referral and you can schedule to make an appointment at your earliest convenience.    Any other questions please let me know..    KIM Robert CNP

## 2022-09-25 ENCOUNTER — HEALTH MAINTENANCE LETTER (OUTPATIENT)
Age: 52
End: 2022-09-25

## 2022-10-14 ENCOUNTER — TRANSFERRED RECORDS (OUTPATIENT)
Dept: HEALTH INFORMATION MANAGEMENT | Facility: CLINIC | Age: 52
End: 2022-10-14

## 2022-10-28 ENCOUNTER — TRANSFERRED RECORDS (OUTPATIENT)
Dept: HEALTH INFORMATION MANAGEMENT | Facility: CLINIC | Age: 52
End: 2022-10-28

## 2022-11-28 ENCOUNTER — TRANSFERRED RECORDS (OUTPATIENT)
Dept: HEALTH INFORMATION MANAGEMENT | Facility: CLINIC | Age: 52
End: 2022-11-28

## 2023-10-14 ENCOUNTER — HEALTH MAINTENANCE LETTER (OUTPATIENT)
Age: 53
End: 2023-10-14

## 2023-10-30 ENCOUNTER — OFFICE VISIT (OUTPATIENT)
Dept: INTERNAL MEDICINE | Facility: CLINIC | Age: 53
End: 2023-10-30
Payer: COMMERCIAL

## 2023-10-30 VITALS
HEIGHT: 68 IN | WEIGHT: 185.44 LBS | BODY MASS INDEX: 28.1 KG/M2 | TEMPERATURE: 97.7 F | HEART RATE: 60 BPM | DIASTOLIC BLOOD PRESSURE: 84 MMHG | SYSTOLIC BLOOD PRESSURE: 116 MMHG | RESPIRATION RATE: 20 BRPM

## 2023-10-30 DIAGNOSIS — R73.03 PREDIABETES: Primary | ICD-10-CM

## 2023-10-30 DIAGNOSIS — F90.0 ADHD, PREDOMINANTLY INATTENTIVE TYPE: ICD-10-CM

## 2023-10-30 DIAGNOSIS — K59.00 CONSTIPATION, UNSPECIFIED CONSTIPATION TYPE: ICD-10-CM

## 2023-10-30 DIAGNOSIS — Z13.220 SCREENING FOR LIPOID DISORDERS: ICD-10-CM

## 2023-10-30 DIAGNOSIS — N52.9 ERECTILE DYSFUNCTION, UNSPECIFIED ERECTILE DYSFUNCTION TYPE: ICD-10-CM

## 2023-10-30 DIAGNOSIS — Z12.5 SPECIAL SCREENING FOR MALIGNANT NEOPLASM OF PROSTATE: ICD-10-CM

## 2023-10-30 DIAGNOSIS — I82.4Y9 DEEP VEIN THROMBOSIS (DVT) OF PROXIMAL LOWER EXTREMITY, UNSPECIFIED CHRONICITY, UNSPECIFIED LATERALITY (H): ICD-10-CM

## 2023-10-30 DIAGNOSIS — Z00.00 ANNUAL PHYSICAL EXAM: ICD-10-CM

## 2023-10-30 DIAGNOSIS — F34.1 DYSTHYMIC DISORDER: ICD-10-CM

## 2023-10-30 LAB
HBA1C MFR BLD: 6.1 % (ref 0–5.6)
LDLC SERPL DIRECT ASSAY-MCNC: 108 MG/DL

## 2023-10-30 PROCEDURE — 99214 OFFICE O/P EST MOD 30 MIN: CPT | Mod: 25 | Performed by: NURSE PRACTITIONER

## 2023-10-30 PROCEDURE — 99396 PREV VISIT EST AGE 40-64: CPT | Performed by: NURSE PRACTITIONER

## 2023-10-30 PROCEDURE — G0103 PSA SCREENING: HCPCS | Performed by: NURSE PRACTITIONER

## 2023-10-30 PROCEDURE — 83036 HEMOGLOBIN GLYCOSYLATED A1C: CPT | Performed by: NURSE PRACTITIONER

## 2023-10-30 PROCEDURE — 36415 COLL VENOUS BLD VENIPUNCTURE: CPT | Performed by: NURSE PRACTITIONER

## 2023-10-30 PROCEDURE — 83721 ASSAY OF BLOOD LIPOPROTEIN: CPT | Performed by: NURSE PRACTITIONER

## 2023-10-30 RX ORDER — ESCITALOPRAM OXALATE 10 MG/1
10 TABLET ORAL DAILY
Qty: 90 TABLET | Refills: 1 | Status: SHIPPED | OUTPATIENT
Start: 2023-10-30 | End: 2024-09-19

## 2023-10-30 RX ORDER — SILDENAFIL 50 MG/1
50 TABLET, FILM COATED ORAL DAILY PRN
Qty: 30 TABLET | Refills: 1 | Status: SHIPPED | OUTPATIENT
Start: 2023-10-30 | End: 2024-09-19

## 2023-10-30 ASSESSMENT — ENCOUNTER SYMPTOMS
ARTHRALGIAS: 1
DIZZINESS: 1
CONSTIPATION: 1
HEADACHES: 1
WEAKNESS: 1
NAUSEA: 1
ABDOMINAL PAIN: 1
NERVOUS/ANXIOUS: 1
MYALGIAS: 1

## 2023-10-30 ASSESSMENT — PATIENT HEALTH QUESTIONNAIRE - PHQ9
SUM OF ALL RESPONSES TO PHQ QUESTIONS 1-9: 23
SUM OF ALL RESPONSES TO PHQ QUESTIONS 1-9: 23
10. IF YOU CHECKED OFF ANY PROBLEMS, HOW DIFFICULT HAVE THESE PROBLEMS MADE IT FOR YOU TO DO YOUR WORK, TAKE CARE OF THINGS AT HOME, OR GET ALONG WITH OTHER PEOPLE: VERY DIFFICULT

## 2023-10-30 NOTE — PROGRESS NOTES
SUBJECTIVE:   CC: Magen is an 53 year old who presents for preventative health visit.       10/30/2023     3:15 PM   Additional Questions   Roomed by SALLY CASSIDY       Healthy Habits:     Getting at least 3 servings of Calcium per day:  Yes    Bi-annual eye exam:  Yes    Dental care twice a year:  NO    Sleep apnea or symptoms of sleep apnea:  Daytime drowsiness    Diet:  Regular (no restrictions)    Frequency of exercise:  None    Taking medications regularly:  Yes    Medication side effects:  Not applicable    Additional concerns today:  Yes  Headache   Associated symptoms include nausea.       Today's PHQ-9 Score:       10/30/2023     3:06 PM   PHQ-9 SCORE   PHQ-9 Total Score MyChart 23 (Severe depression)   PHQ-9 Total Score 23         Social History     Tobacco Use    Smoking status: Never    Smokeless tobacco: Never   Substance Use Topics    Alcohol use: Yes     Comment: Alcoholic Drinks/day: Occasionally             10/30/2023     3:10 PM   Alcohol Use   Prescreen: >3 drinks/day or >7 drinks/week? No       Last PSA:   Prostate Specific Antigen Screen   Date Value Ref Range Status   08/01/2022 0.87 0.00 - 3.50 ng/mL Final   01/09/2019 0.6 0.0 - 2.5 ng/mL Final       Reviewed orders with patient. Reviewed health maintenance and updated orders accordingly - Yes  Lab work is in process    Reviewed and updated as needed this visit by clinical staff   Tobacco  Allergies  Meds              Reviewed and updated as needed this visit by Provider                 No past medical history on file.     Review of Systems   Gastrointestinal:  Positive for abdominal pain, constipation and nausea.   Genitourinary:  Positive for impotence.   Musculoskeletal:  Positive for arthralgias and myalgias.   Neurological:  Positive for dizziness, weakness and headaches.   Psychiatric/Behavioral:  Positive for mood changes. The patient is nervous/anxious.      CONSTITUTIONAL: NEGATIVE for fever, chills, change in weight  INTEGUMENTARY/SKIN:  "NEGATIVE for worrisome rashes, moles or lesions  EYES: NEGATIVE for vision changes or irritation  ENT: NEGATIVE for ear, mouth and throat problems  RESP: NEGATIVE for significant cough or SOB  CV: NEGATIVE for chest pain, palpitations or peripheral edema  GI: NEGATIVE for nausea, abdominal pain, heartburn, or change in bowel habits   male: negative for dysuria, hematuria, decreased urinary stream, erectile dysfunction, urethral discharge  MUSCULOSKELETAL: NEGATIVE for significant arthralgias or myalgia  NEURO: NEGATIVE for weakness, dizziness or paresthesias  PSYCHIATRIC: NEGATIVE for changes in mood or affect    OBJECTIVE:   /84 (BP Location: Right arm, Patient Position: Sitting, Cuff Size: Adult Regular)   Pulse 60   Temp 97.7  F (36.5  C) (Oral)   Resp 20   Ht 1.72 m (5' 7.72\")   Wt 84.1 kg (185 lb 7 oz)   BMI 28.43 kg/m      Physical Exam  GENERAL: healthy, alert and no distress  NECK: no adenopathy, no asymmetry, masses, or scars and thyroid normal to palpation  RESP: lungs clear to auscultation - no rales, rhonchi or wheezes  CV: regular rate and rhythm, normal S1 S2, no S3 or S4, no murmur, click or rub, no peripheral edema and peripheral pulses strong  ABDOMEN: soft, nontender, no hepatosplenomegaly, no masses and bowel sounds normal  MS: no gross musculoskeletal defects noted, no edema    Diagnostic Test Results:  Labs reviewed in Epic    ASSESSMENT/PLAN:     1. Annual physical exam  He is a bit stressed out.  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    2. Prediabetes  A1c greater than 6% when checked last year.  Recheck today.  He suspects that it should be worse based on his dietary indiscretion and lack of physical exercise.  Family history of type 2 diabetes  - Hemoglobin A1c; Future  - Hemoglobin A1c    3. Dysthymic Disorder  Longstanding history of depression.  Initially not open to starting SSRIs but I was able to convince him to try Lexapro. "  We will also see if we can get him established with therapy  - Adult Mental Health [facility] Referral; Future  - escitalopram (LEXAPRO) 10 MG tablet; Take 1 tablet (10 mg) by mouth daily  Dispense: 90 tablet; Refill: 1    4. ADHD, predominantly inattentive type  Has used Adderall in the past but not looking to restart that medication right now.  Seems to be doing fine without it    5. Deep vein thrombosis (DVT) of proximal lower extremity, unspecified chronicity, unspecified laterality (H)  This was following an injury to his left lower extremity while working as an .  He had some significant swelling in his left calf and actually had a blood clot.  Successfully anticoagulated/treated no longer using blood thinners    6. Constipation, unspecified constipation type  We talked about using MiraLAX every day for his constipation.  Its not that he is unable to have a bowel movement he is just having fewer bowel movements than normal    7. Erectile dysfunction, unspecified erectile dysfunction type  We will try Viagra.  - sildenafil (VIAGRA) 50 MG tablet; Take 1 tablet (50 mg) by mouth daily as needed ([place])  Dispense: 30 tablet; Refill: 1    8. Screening for lipoid disorders  He is not fasting today  - LDL cholesterol direct; Future  - LDL cholesterol direct    Patient Instructions   Start escitalopram 10 mg tablet daily for depression and anxiety.    Telephone call in 1 month to recheck depression and anxiety symptoms.    Recheck labs today.  [facility]    Referral for mental health therapy.  Somebody should be contacting you within the next few business days.    Prescription for sildenafil provided.  Take 1 tablet 30 to 60 minutes before intercourse.        Patient has been advised of split billing requirements and indicates understanding: Yes    Depression Screening Follow Up        10/30/2023     3:06 PM   PHQ   PHQ-9 Total Score 23   Q9: Thoughts of better off  "dead/self-harm past 2 weeks Nearly every day   F/U: Thoughts of suicide or self-harm No   F/U: Safety concerns No             BMI:   Estimated body mass index is 28.43 kg/m  as calculated from the following:    Height as of this encounter: 1.72 m (5' 7.72\").    Weight as of this encounter: 84.1 kg (185 lb 7 oz).   Weight management plan: Discussed healthy diet and exercise guidelines      He reports that he has never smoked. He has never used smokeless tobacco.            Magdi Sanchez, [facility]  "

## 2023-10-30 NOTE — PATIENT INSTRUCTIONS
Start escitalopram 10 mg tablet daily for depression and anxiety.    Telephone call in 1 month to recheck depression and anxiety symptoms.    Recheck labs today.  Results will come through [facility].    Referral for mental health therapy.  Somebody should be contacting you within the next few business days.    Prescription for sildenafil provided.  Take 1 tablet 30 to 60 minutes before intercourse.

## 2023-10-31 ENCOUNTER — TELEPHONE (OUTPATIENT)
Dept: INTERNAL MEDICINE | Facility: CLINIC | Age: 53
End: 2023-10-31
Payer: COMMERCIAL

## 2023-10-31 DIAGNOSIS — Z12.5 SPECIAL SCREENING FOR MALIGNANT NEOPLASM OF PROSTATE: Primary | ICD-10-CM

## 2023-10-31 LAB — PSA SERPL DL<=0.01 NG/ML-MCNC: 1 NG/ML (ref 0–3.5)

## 2023-12-11 ENCOUNTER — TRANSFERRED RECORDS (OUTPATIENT)
Dept: HEALTH INFORMATION MANAGEMENT | Facility: CLINIC | Age: 53
End: 2023-12-11
Payer: COMMERCIAL

## 2024-05-15 ENCOUNTER — TRANSFERRED RECORDS (OUTPATIENT)
Dept: HEALTH INFORMATION MANAGEMENT | Facility: CLINIC | Age: 54
End: 2024-05-15
Payer: COMMERCIAL

## 2024-06-28 ENCOUNTER — TRANSFERRED RECORDS (OUTPATIENT)
Dept: HEALTH INFORMATION MANAGEMENT | Facility: CLINIC | Age: 54
End: 2024-06-28
Payer: COMMERCIAL

## 2024-09-19 ENCOUNTER — OFFICE VISIT (OUTPATIENT)
Dept: FAMILY MEDICINE | Facility: CLINIC | Age: 54
End: 2024-09-19
Payer: COMMERCIAL

## 2024-09-19 VITALS
OXYGEN SATURATION: 97 % | RESPIRATION RATE: 16 BRPM | TEMPERATURE: 97.2 F | HEIGHT: 68 IN | DIASTOLIC BLOOD PRESSURE: 83 MMHG | HEART RATE: 58 BPM | SYSTOLIC BLOOD PRESSURE: 125 MMHG | WEIGHT: 180.7 LBS | BODY MASS INDEX: 27.38 KG/M2

## 2024-09-19 DIAGNOSIS — H93.13 TINNITUS, BILATERAL: ICD-10-CM

## 2024-09-19 DIAGNOSIS — R51.9 HEADACHE, CHRONIC DAILY: ICD-10-CM

## 2024-09-19 DIAGNOSIS — R11.0 NAUSEA: ICD-10-CM

## 2024-09-19 DIAGNOSIS — M62.81 MUSCLE WEAKNESS (GENERALIZED): Primary | ICD-10-CM

## 2024-09-19 DIAGNOSIS — R73.03 PREDIABETES: ICD-10-CM

## 2024-09-19 DIAGNOSIS — Z13.6 SCREENING FOR CARDIOVASCULAR CONDITION: ICD-10-CM

## 2024-09-19 DIAGNOSIS — F33.2 SEVERE EPISODE OF RECURRENT MAJOR DEPRESSIVE DISORDER, WITHOUT PSYCHOTIC FEATURES (H): ICD-10-CM

## 2024-09-19 PROBLEM — F33.9 MAJOR DEPRESSION, RECURRENT (H): Status: ACTIVE | Noted: 2024-09-19

## 2024-09-19 LAB
ALBUMIN SERPL BCG-MCNC: 4.4 G/DL (ref 3.5–5.2)
ALP SERPL-CCNC: 97 U/L (ref 40–150)
ALT SERPL W P-5'-P-CCNC: 57 U/L (ref 0–70)
ANION GAP SERPL CALCULATED.3IONS-SCNC: 10 MMOL/L (ref 7–15)
AST SERPL W P-5'-P-CCNC: 29 U/L (ref 0–45)
BASOPHILS # BLD AUTO: 0 10E3/UL (ref 0–0.2)
BASOPHILS NFR BLD AUTO: 0 %
BILIRUB SERPL-MCNC: 0.5 MG/DL
BUN SERPL-MCNC: 17.6 MG/DL (ref 6–20)
CALCIUM SERPL-MCNC: 9.1 MG/DL (ref 8.8–10.4)
CHLORIDE SERPL-SCNC: 105 MMOL/L (ref 98–107)
CHOLEST SERPL-MCNC: 211 MG/DL
CREAT SERPL-MCNC: 1.45 MG/DL (ref 0.67–1.17)
CRP SERPL-MCNC: <3 MG/L
EGFRCR SERPLBLD CKD-EPI 2021: 58 ML/MIN/1.73M2
EOSINOPHIL # BLD AUTO: 0.3 10E3/UL (ref 0–0.7)
EOSINOPHIL NFR BLD AUTO: 5 %
ERYTHROCYTE [DISTWIDTH] IN BLOOD BY AUTOMATED COUNT: 11.8 % (ref 10–15)
ERYTHROCYTE [SEDIMENTATION RATE] IN BLOOD BY WESTERGREN METHOD: 8 MM/HR (ref 0–20)
EST. AVERAGE GLUCOSE BLD GHB EST-MCNC: 123 MG/DL
FASTING STATUS PATIENT QL REPORTED: ABNORMAL
FASTING STATUS PATIENT QL REPORTED: ABNORMAL
GLUCOSE SERPL-MCNC: 103 MG/DL (ref 70–99)
HBA1C MFR BLD: 5.9 % (ref 0–5.6)
HCO3 SERPL-SCNC: 26 MMOL/L (ref 22–29)
HCT VFR BLD AUTO: 44.6 % (ref 40–53)
HDLC SERPL-MCNC: 58 MG/DL
HGB BLD-MCNC: 14.9 G/DL (ref 13.3–17.7)
IMM GRANULOCYTES # BLD: 0 10E3/UL
IMM GRANULOCYTES NFR BLD: 0 %
LDLC SERPL CALC-MCNC: 138 MG/DL
LYMPHOCYTES # BLD AUTO: 2.2 10E3/UL (ref 0.8–5.3)
LYMPHOCYTES NFR BLD AUTO: 41 %
MCH RBC QN AUTO: 31 PG (ref 26.5–33)
MCHC RBC AUTO-ENTMCNC: 33.4 G/DL (ref 31.5–36.5)
MCV RBC AUTO: 93 FL (ref 78–100)
MONOCYTES # BLD AUTO: 0.6 10E3/UL (ref 0–1.3)
MONOCYTES NFR BLD AUTO: 12 %
NEUTROPHILS # BLD AUTO: 2.3 10E3/UL (ref 1.6–8.3)
NEUTROPHILS NFR BLD AUTO: 42 %
NONHDLC SERPL-MCNC: 153 MG/DL
PLATELET # BLD AUTO: 269 10E3/UL (ref 150–450)
POTASSIUM SERPL-SCNC: 3.7 MMOL/L (ref 3.4–5.3)
PROT SERPL-MCNC: 7.1 G/DL (ref 6.4–8.3)
RBC # BLD AUTO: 4.81 10E6/UL (ref 4.4–5.9)
SARS-COV-2 RNA RESP QL NAA+PROBE: NEGATIVE
SODIUM SERPL-SCNC: 141 MMOL/L (ref 135–145)
TRIGL SERPL-MCNC: 75 MG/DL
TSH SERPL DL<=0.005 MIU/L-ACNC: 0.78 UIU/ML (ref 0.3–4.2)
WBC # BLD AUTO: 5.4 10E3/UL (ref 4–11)

## 2024-09-19 PROCEDURE — 80061 LIPID PANEL: CPT | Performed by: NURSE PRACTITIONER

## 2024-09-19 PROCEDURE — 83036 HEMOGLOBIN GLYCOSYLATED A1C: CPT | Performed by: NURSE PRACTITIONER

## 2024-09-19 PROCEDURE — 99214 OFFICE O/P EST MOD 30 MIN: CPT | Performed by: NURSE PRACTITIONER

## 2024-09-19 PROCEDURE — 80053 COMPREHEN METABOLIC PANEL: CPT | Performed by: NURSE PRACTITIONER

## 2024-09-19 PROCEDURE — 87635 SARS-COV-2 COVID-19 AMP PRB: CPT | Performed by: NURSE PRACTITIONER

## 2024-09-19 PROCEDURE — 86618 LYME DISEASE ANTIBODY: CPT | Performed by: NURSE PRACTITIONER

## 2024-09-19 PROCEDURE — 84443 ASSAY THYROID STIM HORMONE: CPT | Performed by: NURSE PRACTITIONER

## 2024-09-19 PROCEDURE — 86140 C-REACTIVE PROTEIN: CPT | Performed by: NURSE PRACTITIONER

## 2024-09-19 PROCEDURE — 85652 RBC SED RATE AUTOMATED: CPT | Performed by: NURSE PRACTITIONER

## 2024-09-19 PROCEDURE — 85025 COMPLETE CBC W/AUTO DIFF WBC: CPT | Mod: QW | Performed by: NURSE PRACTITIONER

## 2024-09-19 PROCEDURE — 36415 COLL VENOUS BLD VENIPUNCTURE: CPT | Performed by: NURSE PRACTITIONER

## 2024-09-19 ASSESSMENT — PATIENT HEALTH QUESTIONNAIRE - PHQ9
SUM OF ALL RESPONSES TO PHQ QUESTIONS 1-9: 26
10. IF YOU CHECKED OFF ANY PROBLEMS, HOW DIFFICULT HAVE THESE PROBLEMS MADE IT FOR YOU TO DO YOUR WORK, TAKE CARE OF THINGS AT HOME, OR GET ALONG WITH OTHER PEOPLE: VERY DIFFICULT
SUM OF ALL RESPONSES TO PHQ QUESTIONS 1-9: 26

## 2024-09-19 NOTE — LETTER
My Depression Action Plan  Name: Magen Cabral   Date of Birth 1970  Date: 9/19/2024    My doctor: Magdi Sanchez   My clinic: [address] [address] [phone]            GREEN    ZONE   Good Control    What it looks like:   Things are going generally well. You have normal ups and downs. You may even feel depressed from time to time, but bad moods usually last less than a day.   What you need to do:  Continue to care for yourself (see self care plan)  Check your depression survival kit and update it as needed  Follow your physician s recommendations including any medication.  Do not stop taking medication unless you consult with your physician first.             YELLOW         ZONE Getting Worse    What it looks like:   Depression is starting to interfere with your life.   It may be hard to get out of bed; you may be starting to isolate yourself from others.  Symptoms of depression are starting to last most all day and this has happened for several days.   You may have suicidal thoughts but they are not constant.   What you need to do:     Call your care team. Your response to treatment will improve if you keep your care team informed of your progress. Yellow periods are signs an adjustment may need to be made.     Continue your self-care.  Just get dressed and ready for the day.  Don't give yourself time to talk yourself out of it.    Talk to someone in your support network.    Open up your Depression Self-Care Plan/Wellness Kit.             RED    ZONE Medical Alert - Get Help    What it looks like:   Depression is seriously interfering with your life.   You may experience these or other symptoms: You can t get out of bed most days, can t work or engage in other necessary activities, you have trouble taking care of basic hygiene, or basic responsibilities, thoughts of suicide or death that will not go away,  self-injurious behavior.     What you need to do:  Call your care team and request a same-day appointment. If they are not available (weekends or after hours) call your local crisis line, emergency room or 911.          Depression Self-Care Plan / Wellness Kit    Many people find that medication and therapy are helpful treatments for managing depression. In addition, making small changes to your everyday life can help to boost your mood and improve your wellbeing. Below are some tips for you to consider. Be sure to talk with your medical provider and/or behavioral health consultant if your symptoms are worsening or not improving.     Sleep   Sleep hygiene  means all of the habits that support good, restful sleep. It includes maintaining a consistent bedtime and wake time, using your bedroom only for sleeping or sex, and keeping the bedroom dark and free of distractions like a computer, smartphone, or television.     Develop a Healthy Routine  Maintain good hygiene. Get out of bed in the morning, make your bed, brush your teeth, take a shower, and get dressed. Don t spend too much time viewing media that makes you feel stressed. Find time to relax each day.    Exercise  Get some form of exercise every day. This will help reduce pain and release endorphins, the  feel good  chemicals in your brain. It can be as simple as just going for a walk or doing some gardening, anything that will get you moving.      Diet  Strive to eat healthy foods, including fruits and vegetables. Drink plenty of water. Avoid excessive sugar, caffeine, alcohol, and other mood-altering substances.     Stay Connected with Others  Stay in touch with friends and family members.    Manage Your Mood  Try deep breathing, massage therapy, biofeedback, or meditation. Take part in fun activities when you can. Try to find something to smile about each day.     Psychotherapy  Be open to working with a therapist if your provider recommends it.      Medication  Be sure to take your medication as prescribed. Most anti-depressants need to be taken every day. It usually takes several weeks for medications to work. Not all medicines work for all people. It is important to follow-up with your provider to make sure you have a treatment plan that is working for you. Do not stop your medication abruptly without first discussing it with your provider.    Crisis Resources   These hotlines are for both adults and children. They and are open 24 hours a day, 7 days a week unless noted otherwise.    National Suicide Prevention Lifeline   988 or 7-290-404-DQFR (3072)    Crisis Text Line    www.crisistextline.org  Text HOME to 334094 from anywhere in the [place], anytime, about any type of crisis. A live, trained crisis counselor will receive the text and respond quickly.    [place] Lifeline for LGBTQ Youth  A national crisis intervention and suicide lifeline for LGBTQ youth under 25. Provides a safe place to talk without judgement. Call 1-333.517.8187; text START to 013645 or visit www.thetrevorproject.org to talk to a trained counselor.    For [facility] crisis numbers, visit the [facility] website at:  https://[place].gov/dhs/people-we-serve/adults/health-care/mental-health/resources/crisis-contacts.jsp

## 2024-09-19 NOTE — PROGRESS NOTES
Assessment & Plan     Muscle Weakness Generalized  2 to 3-week history of generalized muscle weakness, fatigue, nausea every morning.  Headache x 1 month.  No known exposures, will check for COVID-19 today, although with duration of symptoms may not be revealing.  Differentials under consideration include anemia, electrolyte abnormality, thyroid dysfunction, myopathy, tick borne illness, hyperglycemia.  Denies smoking or marijuana use.  Does have phlegmy cough in the morning.  No heavy alcohol intake.  Works in [place] so does have potential for tickborne illness.  - Symptomatic COVID-19 Virus (Coronavirus) by PCR Nose  - Comprehensive metabolic panel  - TSH  - CBC with Platelets & Differential  - LYME DISEASE TOTAL ANTIBODIES WITH REFLEX TO CONFIRMATION  - ESR: Erythrocyte sedimentation rate  - CRP, inflammation    Severe episode of recurrent major depressive disorder, without psychotic features (H)  Was on escitalopram in the past but states made depression worse.  At that time was also on Adderall for ADHD.  This medication stopped several years ago.  Will check TSH today.  Will continue with counseling which she states is effective.  - TSH    Nausea  Lab work as below.  Has decreased appetite and altered taste, denies vomiting.  Does have history of H. pylori infection, most recently seen by [facility].  Colonoscopy up-to-date.  No epigastric pain.  - CBC with Platelets & Differential  - LYME DISEASE TOTAL ANTIBODIES WITH REFLEX TO CONFIRMATION    Headache, chronic daily  Has a daily headache for the last month.  Describes a headache over the left eye to frontal scalp.  Endorses loud snoring and daytime somnolence only with driving.  If lab work is unrevealing, I would recommend referral for sleep study.  - CBC with Platelets & Differential; Future  - Symptomatic COVID-19 Virus (Coronavirus) by PCR Nose  - LYME DISEASE TOTAL ANTIBODIES WITH REFLEX TO CONFIRMATION; Future  - CBC with Platelets & Differential  -  "LYME DISEASE TOTAL ANTIBODIES WITH REFLEX TO CONFIRMATION    Screening for cardiovascular condition  - Lipid panel reflex to direct LDL Fasting; Future  - Lipid panel reflex to direct LDL Fasting    Prediabetes  - Hemoglobin A1c; Future  - Hemoglobin A1c    Tinnitus, bilateral   He wonders about treatment for tinnitus.  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  Reports he had his hearing checked already.  Information provided on tinnitus and measures to take to cope with the noise.    BMI  Estimated body mass index is 27.7 kg/m  as calculated from the following:    Height as of this encounter: 1.72 m (5' 7.72\").    Weight as of this encounter: 82 kg (180 lb 11.2 oz).       Depression Screening Follow Up        9/19/2024     8:13 AM   PHQ   PHQ-9 Total Score 26   Q9: Thoughts of better off dead/self-harm past 2 weeks More than half the days   F/U: Thoughts of suicide or self-harm No   F/U: Safety concerns No                     Follow Up Actions Taken  Crisis resource information provided in the After Visit Summary  Referred patient back to mental health provider  Patient declined referral.    Discussed the following ways the patient can remain in a safe environment:  be around others        Dorothy Us is a 54 year old, presenting for the following health issues:  Follow Up (Discuss weakness 2-3 weeks, not feeling, decreased appetite, nausea, headaches x 1 month, ringing in ears x several months   )      9/19/2024     8:23 AM   Additional Questions   Roomed by Yue FALCON CMA   Accompanied by Self     Magen is a pleasant 54-year-old male who presents with his partner Abena for evaluation of symptoms.   HA X 1 month, muscle weakness, nausea without vomiting X 2-3 weeks.  Diagnosed with prediabetes 1 yr ago  Polydipsia, sweating but denies fever or chills.  Denies abdominal pain.  Lately has had more constipation but typically has 3 bowel " "movements daily.  No blood in the stool although recent bowel movement was darker than usual.  Up-to-date with colonoscopy in 2022-tubular adenoma.    H/o pylori found with EGD, treated, followed by [facility]  Did colonoscopy 2022, tubular adenoma, repeat 5 year  Brother- older half brother with colon cancer  No colon cancer on his mother side.     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  Food is too salty, or no flavor  Increased thirst, increased anxiety.  Daughter with thyroid dysfunction  Doing counseling    Depression: Used to be on escitalopram for depression, kind of made depression worse  Was also on Adderall  Went off maybe 2015 or 2017    Works in [employer], cuts grass  No rash, did have bug bite, doesn't think a tick bite    Also reports:  Loud snoring  Feels like he could fall asleep with driving      History of Present Illness       Reason for visit:  Feeling weak, severe headaches, legs hurt, no appettite, weight loss, ringing in ears, nausea and feeling like i want to throw up every morning   He is taking medications regularly.                 Review of Systems  Constitutional, neuro, ENT, endocrine, pulmonary, cardiac, gastrointestinal, genitourinary, musculoskeletal, integument and psychiatric systems are negative, except as otherwise noted.      Objective    /83 (BP Location: Right arm, Patient Position: Sitting, Cuff Size: Adult Large)   Pulse 58   Temp 97.2  F (36.2  C) (Tympanic)   Resp 16   Ht 1.72 m (5' 7.72\")   Wt 82 kg (180 lb 11.2 oz)   SpO2 97%   BMI 27.70 kg/m    Body mass index is 27.7 kg/m .  Physical Exam   GENERAL: alert and no distress  EYES: Eyes grossly normal to inspection, PERRL and conjunctivae and sclerae normal  HENT: ear canals and TM's " normal, nose and mouth without ulcers or lesions  NECK: no adenopathy, no asymmetry, masses, or scars  RESP: lungs clear to auscultation - no rales, rhonchi or wheezes  CV: regular rate and rhythm, normal S1 S2, no S3 or S4, no murmur, click or rub, no peripheral edema  ABDOMEN: soft, nontender, no hepatosplenomegaly, no masses and bowel sounds normal  MS: no gross musculoskeletal defects noted, no edema, strength equal in all extremities.  SKIN: no suspicious lesions or rashes  NEURO: Normal strength and tone, mentation intact and speech normal  PSYCH: mentation appears normal, affect normal/bright    Results for orders placed or performed in visit on 09/19/24 (from the past 24 hour(s))   CBC with Platelets & Differential    Narrative    The following orders were created for panel order CBC with Platelets & Differential.  Procedure                               Abnormality         Status                     ---------                               -----------         ------                     CBC with platelets and d...[[id number]]                      Final result                 Please view results for these tests on the individual orders.   Hemoglobin A1c   Result Value Ref Range    Estimated Average Glucose 123 (H) <117 mg/dL    Hemoglobin A1C 5.9 (H) 0.0 - 5.6 %   ESR: Erythrocyte sedimentation rate   Result Value Ref Range    Erythrocyte Sedimentation Rate 8 0 - 20 mm/hr   CBC with platelets and differential   Result Value Ref Range    WBC Count 5.4 4.0 - 11.0 10e3/uL    RBC Count 4.81 4.40 - 5.90 10e6/uL    Hemoglobin 14.9 13.3 - 17.7 g/dL    Hematocrit 44.6 40.0 - 53.0 %    MCV 93 78 - 100 fL    MCH 31.0 26.5 - 33.0 pg    MCHC 33.4 31.5 - 36.5 g/dL    RDW 11.8 10.0 - 15.0 %    Platelet Count 269 150 - 450 10e3/uL    % Neutrophils 42 %    % Lymphocytes 41 %    % Monocytes 12 %    % Eosinophils 5 %    % Basophils 0 %    % Immature Granulocytes 0 %    Absolute Neutrophils 2.3 1.6 - 8.3 10e3/uL    Absolute  Lymphocytes 2.2 0.8 - 5.3 10e3/uL    Absolute Monocytes 0.6 0.0 - 1.3 10e3/uL    Absolute Eosinophils 0.3 0.0 - 0.7 10e3/uL    Absolute Basophils 0.0 0.0 - 0.2 10e3/uL    Absolute Immature Granulocytes 0.0 <=0.4 10e3/uL           Signed Electronically by: KIM Murillo CNP

## 2024-09-20 LAB — B BURGDOR IGG+IGM SER QL: 0.15

## 2024-09-24 ENCOUNTER — TELEPHONE (OUTPATIENT)
Dept: FAMILY MEDICINE | Facility: CLINIC | Age: 54
End: 2024-09-24

## 2024-09-24 ENCOUNTER — VIRTUAL VISIT (OUTPATIENT)
Dept: FAMILY MEDICINE | Facility: CLINIC | Age: 54
End: 2024-09-24
Payer: COMMERCIAL

## 2024-09-24 DIAGNOSIS — N18.31 STAGE 3A CHRONIC KIDNEY DISEASE (CKD) (H): Primary | ICD-10-CM

## 2024-09-24 DIAGNOSIS — E78.5 HYPERLIPIDEMIA LDL GOAL <100: ICD-10-CM

## 2024-09-24 PROCEDURE — 99441 PR PHYSICIAN TELEPHONE EVALUATION 5-10 MIN: CPT | Mod: 93 | Performed by: NURSE PRACTITIONER

## 2024-09-24 NOTE — TELEPHONE ENCOUNTER
VM left for pt, requested return call for results message from provider.       ----- Message from Marjorie Garrison sent at 9/23/2024  5:57 PM CDT -----  Team - please call patient with results.    Kidney function is stable but creatinine is elevated at 1.45.  I recommend consult with nephrology if he has never had a consultation completed.  If he is agreeable I will place a referral.    His cholesterol is elevated, similar to 2 years ago.  His 10-year risk for heart attack and stroke is 5.7%.  Recommend starting a low intensity statin medication if he is agreeable.    A1c stable in prediabetes range, TSH within normal limits, negative inflammatory markers, and COVID test is negative.  Thank you.

## 2024-09-24 NOTE — TELEPHONE ENCOUNTER
09/24/2024    Pt returned call. TC relayed message. Pt states he has some additional questions regarding his lab results that he wants to ask Marjorie Garrison. Pt wanted to schedule a telephone visit for today 09/24/2024 at 4:00PM (arrival time 3:40PM).     Nothing else needed at this time.    [facility]

## 2024-09-24 NOTE — PROGRESS NOTES
"Magen is a 54 year old who is being evaluated via a billable telephone visit.      Originating Location (pt. Location): Home    Distant Location (provider location):  On-site    Assessment & Plan     Stage 3a chronic kidney disease (CKD) (H)  We discussed recent lab results with finding of chronic kidney disease with creatinine of 1.47 and eGFR of 58.  Looking back over the past 5 years, has had elevated creatinine up to 1.55.  Discussed that although his numbers are not overly concerning and they are stable, would be a good idea to have nephrology consult if not done in the past for evaluation of chronic kidney disease given the persistent kidney dysfunction noted without known cause.  - Adult Nephrology  Referral; Future    Hyperlipidemia LDL goal <100  He defers starting statin medication.  Prefers to focus on heart healthy diet and increasing physical activity.  We did discuss that his ASCVD risk score is 5.7% for heart attack or stroke in the next 10 years.          BMI  Estimated body mass index is 27.7 kg/m  as calculated from the following:    Height as of 9/19/24: 1.72 m (5' 7.72\").    Weight as of 9/19/24: 82 kg (180 lb 11.2 oz).             Subjective   Magen is a 54 year old, presenting for the following health issues:  Results (Discuss lab results from 09/19/2024)      9/24/2024     3:45 PM   Additional Questions   Roomed by SALLY Black     Magen presents today for follow-up to discuss lab results.  He was evaluated last week for generalized muscle weakness, headache and nausea.  Lab results confirm prediabetes and elevated cholesterol with persistent kidney dysfunction.                     Objective           Vitals:  No vitals were obtained today due to virtual visit.    Physical Exam   General: Alert and no distress //Respiratory: No audible wheeze, cough, or shortness of breath // Psychiatric:  Appropriate affect, tone, and pace of words      Office Visit on 09/19/2024   Component Date Value " Ref Range Status    SARS CoV2 PCR 09/19/2024 Negative  Negative Final    NEGATIVE: SARS-CoV-2 (COVID-19) RNA not detected, presumed negative.    Sodium 09/19/2024 141  135 - 145 mmol/L Final    Potassium 09/19/2024 3.7  3.4 - 5.3 mmol/L Final    Carbon Dioxide (CO2) 09/19/2024 26  22 - 29 mmol/L Final    Anion Gap 09/19/2024 10  7 - 15 mmol/L Final    Urea Nitrogen 09/19/2024 17.6  6.0 - 20.0 mg/dL Final    Creatinine 09/19/2024 1.45 (H)  0.67 - 1.17 mg/dL Final    GFR Estimate 09/19/2024 58 (L)  >60 mL/min/1.73m2 Final    eGFR calculated using 2021 CKD-EPI equation.    Calcium 09/19/2024 9.1  8.8 - 10.4 mg/dL Final    Reference intervals for this test were updated on 7/16/2024 to reflect our healthy population more accurately. There may be differences in the flagging of prior results with similar values performed with this method. Those prior results can be interpreted in the context of the updated reference intervals.    Chloride 09/19/2024 105  98 - 107 mmol/L Final    Glucose 09/19/2024 103 (H)  70 - 99 mg/dL Final    Alkaline Phosphatase 09/19/2024 97  40 - 150 U/L Final    AST 09/19/2024 29  0 - 45 U/L Final    ALT 09/19/2024 57  0 - 70 U/L Final    Protein Total 09/19/2024 7.1  6.4 - 8.3 g/dL Final    Albumin 09/19/2024 4.4  3.5 - 5.2 g/dL Final    Bilirubin Total 09/19/2024 0.5  <=1.2 mg/dL Final    Patient Fasting > 8hrs? 09/19/2024 Unknown   Final    Cholesterol 09/19/2024 211 (H)  <200 mg/dL Final    Triglycerides 09/19/2024 75  <150 mg/dL Final    Direct Measure HDL 09/19/2024 58  >=40 mg/dL Final    LDL Cholesterol Calculated 09/19/2024 138 (H)  <100 mg/dL Final    Non HDL Cholesterol 09/19/2024 153 (H)  <130 mg/dL Final    Patient Fasting > 8hrs? 09/19/2024 Unknown   Final    TSH 09/19/2024 0.78  0.30 - 4.20 uIU/mL Final    Estimated Average Glucose 09/19/2024 123 (H)  <117 mg/dL Final    Hemoglobin A1C 09/19/2024 5.9 (H)  0.0 - 5.6 % Final    Normal <5.7%   Prediabetes 5.7-6.4%    Diabetes 6.5% or  higher     Note: Adopted from ADA consensus guidelines.    Lyme Disease Antibodies Total 09/19/2024 0.15  <0.90 Final    Non-reactive, Absence of detectable Borrelia burgdorferi antibodies. A non-reactive result does not exclude the possibility of Borrelia burgdorferi infection. If early Lyme disease is suspected, a second sample should be collected and tested 2 to 4 weeks later.    Erythrocyte Sedimentation Rate 09/19/2024 8  0 - 20 mm/hr Final    CRP Inflammation 09/19/2024 <3.00  <5.00 mg/L Final    WBC Count 09/19/2024 5.4  4.0 - 11.0 10e3/uL Final    RBC Count 09/19/2024 4.81  4.40 - 5.90 10e6/uL Final    Hemoglobin 09/19/2024 14.9  13.3 - 17.7 g/dL Final    Hematocrit 09/19/2024 44.6  40.0 - 53.0 % Final    MCV 09/19/2024 93  78 - 100 fL Final    MCH 09/19/2024 31.0  26.5 - 33.0 pg Final    MCHC 09/19/2024 33.4  31.5 - 36.5 g/dL Final    RDW 09/19/2024 11.8  10.0 - 15.0 % Final    Platelet Count 09/19/2024 269  150 - 450 10e3/uL Final    % Neutrophils 09/19/2024 42  % Final    % Lymphocytes 09/19/2024 41  % Final    % Monocytes 09/19/2024 12  % Final    % Eosinophils 09/19/2024 5  % Final    % Basophils 09/19/2024 0  % Final    % Immature Granulocytes 09/19/2024 0  % Final    Absolute Neutrophils 09/19/2024 2.3  1.6 - 8.3 10e3/uL Final    Absolute Lymphocytes 09/19/2024 2.2  0.8 - 5.3 10e3/uL Final    Absolute Monocytes 09/19/2024 0.6  0.0 - 1.3 10e3/uL Final    Absolute Eosinophils 09/19/2024 0.3  0.0 - 0.7 10e3/uL Final    Absolute Basophils 09/19/2024 0.0  0.0 - 0.2 10e3/uL Final    Absolute Immature Granulocytes 09/19/2024 0.0  <=0.4 10e3/uL Final         Phone call duration: 9 min 48 sec  Signed Electronically by: KIM Murillo CNP

## 2024-09-30 ENCOUNTER — PATIENT OUTREACH (OUTPATIENT)
Dept: CARE COORDINATION | Facility: CLINIC | Age: 54
End: 2024-09-30
Payer: COMMERCIAL

## 2024-10-14 ENCOUNTER — PATIENT OUTREACH (OUTPATIENT)
Dept: CARE COORDINATION | Facility: CLINIC | Age: 54
End: 2024-10-14
Payer: COMMERCIAL

## 2024-11-05 ENCOUNTER — HOSPITAL ENCOUNTER (OUTPATIENT)
Dept: ULTRASOUND IMAGING | Facility: CLINIC | Age: 54
Discharge: HOME | End: 2024-11-05
Attending: INTERNAL MEDICINE
Payer: COMMERCIAL

## 2024-11-05 DIAGNOSIS — N18.30 CKD (CHRONIC KIDNEY DISEASE) STAGE 3, GFR 30-59 ML/MIN (H): ICD-10-CM

## 2024-11-05 PROCEDURE — 76770 US EXAM ABDO BACK WALL COMP: CPT

## 2024-11-16 ENCOUNTER — APPOINTMENT (OUTPATIENT)
Dept: RADIOLOGY | Facility: CLINIC | Age: 54
End: 2024-11-16

## 2024-11-16 ENCOUNTER — HOSPITAL ENCOUNTER (EMERGENCY)
Facility: CLINIC | Age: 54
Discharge: HOME | End: 2024-11-16

## 2024-11-16 VITALS
SYSTOLIC BLOOD PRESSURE: 120 MMHG | HEIGHT: 69 IN | BODY MASS INDEX: 26.66 KG/M2 | TEMPERATURE: 98 F | HEART RATE: 47 BPM | WEIGHT: 180 LBS | RESPIRATION RATE: 16 BRPM | DIASTOLIC BLOOD PRESSURE: 72 MMHG | OXYGEN SATURATION: 99 %

## 2024-11-16 DIAGNOSIS — M25.512 ACUTE PAIN OF BOTH SHOULDERS: ICD-10-CM
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
## 2024-11-16 DIAGNOSIS — M54.9 MUSCULOSKELETAL BACK PAIN: ICD-10-CM

## 2024-11-16 DIAGNOSIS — R00.1 BRADYCARDIA: ICD-10-CM

## 2024-11-16 DIAGNOSIS — M25.511 ACUTE PAIN OF BOTH SHOULDERS: ICD-10-CM

## 2024-11-16 PROCEDURE — 72072 X-RAY EXAM THORAC SPINE 3VWS: CPT

## 2024-11-16 PROCEDURE — 99285 EMERGENCY DEPT VISIT HI MDM: CPT | Mod: 25

## 2024-11-16 PROCEDURE — 250N000011 HC RX IP 250 OP 636

## 2024-11-16 PROCEDURE — 73030 X-RAY EXAM OF SHOULDER: CPT | Mod: 50

## 2024-11-16 PROCEDURE — 72100 X-RAY EXAM L-S SPINE 2/3 VWS: CPT

## 2024-11-16 PROCEDURE — 96372 THER/PROPH/DIAG INJ SC/IM: CPT

## 2024-11-16 PROCEDURE — 72040 X-RAY EXAM NECK SPINE 2-3 VW: CPT

## 2024-11-16 RX ORDER — KETOROLAC TROMETHAMINE 30 MG/ML
30 INJECTION, SOLUTION INTRAMUSCULAR; INTRAVENOUS ONCE
Status: COMPLETED | OUTPATIENT
Start: 2024-11-16 | End: 2024-11-16

## 2024-11-16 RX ORDER — LIDOCAINE 4 G/G
1 PATCH TOPICAL EVERY 24 HOURS
Qty: 7 PATCH | Refills: 0 | Status: SHIPPED | OUTPATIENT
Start: 2024-11-16 | End: 2024-11-23

## 2024-11-16 RX ADMIN — KETOROLAC TROMETHAMINE 30 MG: 30 INJECTION, SOLUTION INTRAMUSCULAR at 12:56

## 2024-11-16 ASSESSMENT — COLUMBIA-SUICIDE SEVERITY RATING SCALE - C-SSRS
2. HAVE YOU ACTUALLY HAD ANY THOUGHTS OF KILLING YOURSELF IN THE PAST MONTH?: NO
6. HAVE YOU EVER DONE ANYTHING, STARTED TO DO ANYTHING, OR PREPARED TO DO ANYTHING TO END YOUR LIFE?: NO
1. IN THE PAST MONTH, HAVE YOU WISHED YOU WERE DEAD OR WISHED YOU COULD GO TO SLEEP AND NOT WAKE UP?: NO

## 2024-11-16 ASSESSMENT — ACTIVITIES OF DAILY LIVING (ADL)
ADLS_ACUITY_SCORE: 0
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
## 2024-11-19 ENCOUNTER — PATIENT OUTREACH (OUTPATIENT)
Dept: INTERNAL MEDICINE | Facility: CLINIC | Age: 54
End: 2024-11-19
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
## 2024-12-07 ENCOUNTER — HEALTH MAINTENANCE LETTER (OUTPATIENT)
Age: 54
End: 2024-12-07

## 2024-12-17 ENCOUNTER — OFFICE VISIT (OUTPATIENT)
Dept: FAMILY MEDICINE | Facility: CLINIC | Age: 54
End: 2024-12-17
Payer: COMMERCIAL

## 2024-12-17 VITALS
TEMPERATURE: 97.8 F | SYSTOLIC BLOOD PRESSURE: 118 MMHG | DIASTOLIC BLOOD PRESSURE: 72 MMHG | RESPIRATION RATE: 16 BRPM | HEIGHT: 69 IN | WEIGHT: 186 LBS | BODY MASS INDEX: 27.55 KG/M2 | OXYGEN SATURATION: 97 % | HEART RATE: 56 BPM

## 2024-12-17 DIAGNOSIS — R10.13 EPIGASTRIC PAIN: Primary | ICD-10-CM

## 2024-12-17 DIAGNOSIS — Z86.19 HISTORY OF HELICOBACTER PYLORI INFECTION: ICD-10-CM

## 2024-12-17 DIAGNOSIS — Z23 NEED FOR COVID-19 VACCINE: ICD-10-CM

## 2024-12-17 DIAGNOSIS — G47.00 INSOMNIA, UNSPECIFIED TYPE: ICD-10-CM

## 2024-12-17 DIAGNOSIS — R74.8 ELEVATED LIPASE: ICD-10-CM

## 2024-12-17 LAB
ALBUMIN SERPL BCG-MCNC: 4.1 G/DL (ref 3.5–5.2)
ALP SERPL-CCNC: 114 U/L (ref 40–150)
ALT SERPL W P-5'-P-CCNC: 35 U/L (ref 0–70)
ANION GAP SERPL CALCULATED.3IONS-SCNC: 9 MMOL/L (ref 7–15)
AST SERPL W P-5'-P-CCNC: 21 U/L (ref 0–45)
BASOPHILS # BLD AUTO: 0 10E3/UL (ref 0–0.2)
BASOPHILS NFR BLD AUTO: 1 %
BILIRUB SERPL-MCNC: 0.3 MG/DL
BUN SERPL-MCNC: 10.7 MG/DL (ref 6–20)
CALCIUM SERPL-MCNC: 9.4 MG/DL (ref 8.8–10.4)
CHLORIDE SERPL-SCNC: 103 MMOL/L (ref 98–107)
CREAT SERPL-MCNC: 1.39 MG/DL (ref 0.67–1.17)
EGFRCR SERPLBLD CKD-EPI 2021: 61 ML/MIN/1.73M2
EOSINOPHIL # BLD AUTO: 0.3 10E3/UL (ref 0–0.7)
EOSINOPHIL NFR BLD AUTO: 6 %
ERYTHROCYTE [DISTWIDTH] IN BLOOD BY AUTOMATED COUNT: 11.8 % (ref 10–15)
GLUCOSE SERPL-MCNC: 97 MG/DL (ref 70–99)
HCO3 SERPL-SCNC: 29 MMOL/L (ref 22–29)
HCT VFR BLD AUTO: 43.5 % (ref 40–53)
HGB BLD-MCNC: 14.3 G/DL (ref 13.3–17.7)
IMM GRANULOCYTES # BLD: 0 10E3/UL
IMM GRANULOCYTES NFR BLD: 0 %
LIPASE SERPL-CCNC: 617 U/L (ref 13–60)
LYMPHOCYTES # BLD AUTO: 2 10E3/UL (ref 0.8–5.3)
LYMPHOCYTES NFR BLD AUTO: 40 %
MCH RBC QN AUTO: 30.6 PG (ref 26.5–33)
MCHC RBC AUTO-ENTMCNC: 32.9 G/DL (ref 31.5–36.5)
MCV RBC AUTO: 93 FL (ref 78–100)
MONOCYTES # BLD AUTO: 0.5 10E3/UL (ref 0–1.3)
MONOCYTES NFR BLD AUTO: 11 %
NEUTROPHILS # BLD AUTO: 2.1 10E3/UL (ref 1.6–8.3)
NEUTROPHILS NFR BLD AUTO: 42 %
PLATELET # BLD AUTO: 236 10E3/UL (ref 150–450)
POTASSIUM SERPL-SCNC: 4.3 MMOL/L (ref 3.4–5.3)
PROT SERPL-MCNC: 6.7 G/DL (ref 6.4–8.3)
RBC # BLD AUTO: 4.67 10E6/UL (ref 4.4–5.9)
SODIUM SERPL-SCNC: 141 MMOL/L (ref 135–145)
WBC # BLD AUTO: 4.9 10E3/UL (ref 4–11)

## 2024-12-17 PROCEDURE — 90480 ADMN SARSCOV2 VAC 1/ONLY CMP: CPT | Performed by: NURSE PRACTITIONER

## 2024-12-17 PROCEDURE — 99214 OFFICE O/P EST MOD 30 MIN: CPT | Performed by: NURSE PRACTITIONER

## 2024-12-17 PROCEDURE — 36415 COLL VENOUS BLD VENIPUNCTURE: CPT | Performed by: NURSE PRACTITIONER

## 2024-12-17 PROCEDURE — 85025 COMPLETE CBC W/AUTO DIFF WBC: CPT | Mod: QW | Performed by: NURSE PRACTITIONER

## 2024-12-17 PROCEDURE — 80053 COMPREHEN METABOLIC PANEL: CPT | Performed by: NURSE PRACTITIONER

## 2024-12-17 PROCEDURE — 83690 ASSAY OF LIPASE: CPT | Performed by: NURSE PRACTITIONER

## 2024-12-17 PROCEDURE — 91320 SARSCV2 VAC 30MCG TRS-SUC IM: CPT | Performed by: NURSE PRACTITIONER

## 2024-12-17 RX ORDER — LIDOCAINE 4 %
ADHESIVE PATCH, MEDICATED TOPICAL
COMMUNITY
Start: 2024-12-05

## 2024-12-17 ASSESSMENT — COLUMBIA-SUICIDE SEVERITY RATING SCALE - C-SSRS
2. IN THE PAST MONTH, HAVE YOU ACTUALLY HAD ANY THOUGHTS OF KILLING YOURSELF?: NO
1. WITHIN THE PAST MONTH, HAVE YOU WISHED YOU WERE DEAD OR WISHED YOU COULD GO TO SLEEP AND NOT WAKE UP?: YES
6. HAVE YOU EVER DONE ANYTHING, STARTED TO DO ANYTHING, OR PREPARED TO DO ANYTHING TO END YOUR LIFE?: NO

## 2024-12-17 ASSESSMENT — PATIENT HEALTH QUESTIONNAIRE - PHQ9
SUM OF ALL RESPONSES TO PHQ QUESTIONS 1-9: 23
10. IF YOU CHECKED OFF ANY PROBLEMS, HOW DIFFICULT HAVE THESE PROBLEMS MADE IT FOR YOU TO DO YOUR WORK, TAKE CARE OF THINGS AT HOME, OR GET ALONG WITH OTHER PEOPLE: SOMEWHAT DIFFICULT
SUM OF ALL RESPONSES TO PHQ QUESTIONS 1-9: 23

## 2024-12-17 NOTE — PROGRESS NOTES
Assessment & Plan     Epigastric pain  2-week history of epigastric pain with history of H. pylori infection, symptoms feel similar.  Has nausea that is worse in the morning.  Pain initially feels better after eating and then worsens.  Had recent trip to [place] but symptoms started prior.  We will check lab work as below including stool for H. pylori.  After completion of stool study, he can start omeprazole 20 mg daily.  Recommend decreasing or avoiding CBD/THC.  Briefly discussed cannabinoid hyperemesis syndrome and information provided.  - omeprazole (PRILOSEC) 20 MG DR capsule; Take 1 capsule (20 mg) by mouth daily.  - Helicobacter pylori Antigen Stool  - Comprehensive metabolic panel  - CBC with Platelets & Differential  - Lipase    Lab results returned and shows markedly elevated Lipase, normal LFTs, stable creatinine and eGFR.  Discussed with patient this morning and recommend ED evaluation for acute pancreatitis, patient is agreeable.  Communicated with ADS provider and agree with ED recommendation.   Patient denies current heavy alcohol use but has history of heavy alcohol use while in the .  Has recently increased use of CBD gummies and Delta 9.   Lipase also elevated 2 years ago, but not as significant as current lipase.    Emergency department note, imaging negative for pancreatitis.  Repeat lipase decreased but still elevated, similar to levels from 2 years ago.  Referral placed to gastroenterology.    History of Helicobacter pylori infection  - Helicobacter pylori Antigen Stool; Future  - Comprehensive metabolic panel; Future  - CBC with Platelets & Differential; Future  - Lipase; Future  - Helicobacter pylori Antigen Stool  - Comprehensive metabolic panel  - CBC with Platelets & Differential  - Lipase    Need for COVID-19 vaccine  Agreeable to COVID-19 booster today.  - COVID-19 12+ (PFIZER)    Insomnia, unspecified type  We discussed insomnia symptoms today.  Offered trazodone and  "discussed that this is an antidepressant that is commonly prescribed for sleep and is not a controlled substance.  He has been using CBD Gummies and delta 9- admits to taking high doses of CBD gummies.  We discussed sleep hygiene measures and restarting melatonin.  He will consider trazodone and information provided.  He does have significant depression active suicidal thoughts.  He is currently in therapy..      Depression Screening Follow Up        12/17/2024     7:50 AM   PHQ   PHQ-9 Total Score 23    Q9: Thoughts of better off dead/self-harm past 2 weeks Several days    F/U: Thoughts of suicide or self-harm No    F/U: Safety concerns No        Patient-reported                 12/17/2024    12:50 PM   C-SSRS (Brief [place])   Within the last month, have you wished you were dead or wished you could go to sleep and not wake up? Yes   Within the last month, have you had any actual thoughts of killing yourself? No   Within the last month, have you ever done anything, started to do anything, or prepared to do anything to end your life? No           Follow Up Actions Taken  Crisis resource information provided in the After Visit Summary  Referred patient back to mental health provider    Discussed the following ways the patient can remain in a safe environment:  be around others          BMI  Estimated body mass index is 27.47 kg/m  as calculated from the following:    Height as of this encounter: 1.753 m (5' 9\").    Weight as of this encounter: 84.4 kg (186 lb).       Depression Screening Follow Up        12/17/2024     7:50 AM   PHQ   PHQ-9 Total Score 23    Q9: Thoughts of better off dead/self-harm past 2 weeks Several days    F/U: Thoughts of suicide or self-harm No    F/U: Safety concerns No        Patient-reported       Subjective   Magen is a 54 year old, presenting for the following health issues:  Abdominal Pain (Stomach issues x 2 weeks, poss H pylori, pt has had it in the past and says he feels as though it " "came back)        12/17/2024     8:01 AM   Additional Questions   Roomed by SALLY Black   Accompanied by Spouse     Magen presents today with his wife for evaluation of epigastric pain onset 2 weeks ago worse in the mid and left epigastric area.  H pylori infection over 5 years ago, with egd/colonoscopy and treated. Retested a couple years ago through [facility] and negative.  Nausea every morning, no vomiting  [place] X 1 week, felt sx before he left  Vomiting on plane ride back  No coffee ground emesis  Worse with eating, a little comfort initially and then worsens  Intermittent looser stool, not watery  No blood in stool  No fever  couple chills in the morning  Does kombucha, next  No new medication or antiacids  Zofran for nausea  Does use CBD/THC Gummies and states has been taking higher doses in the morning and at night, also delta 9 recently but stopped and is only taking the CBD/THC gummies.        History of Present Illness       Reason for visit:  Stomach pain   He is taking medications regularly.                 Review of Systems  Constitutional, HEENT, cardiovascular, pulmonary, gi and gu systems are negative, except as otherwise noted.      Objective    /72 (BP Location: Right arm, Patient Position: Sitting, Cuff Size: Adult Large)   Pulse 56   Temp 97.8  F (36.6  C) (Tympanic)   Resp 16   Ht 1.753 m (5' 9\")   Wt 84.4 kg (186 lb)   SpO2 97%   BMI 27.47 kg/m    Body mass index is 27.47 kg/m .  Physical Exam  Constitutional:       General: He is not in acute distress.     Appearance: Normal appearance. He is not ill-appearing.   HENT:      Head: Normocephalic and atraumatic.      Mouth/Throat:      Mouth: Mucous membranes are moist.   Eyes:      Pupils: Pupils are equal, round, and reactive to light.   Cardiovascular:      Rate and Rhythm: Normal rate and regular rhythm.      Pulses: Normal pulses.      Heart sounds: Normal heart sounds.   Pulmonary:      Effort: Pulmonary effort is normal.      " Breath sounds: Normal breath sounds.   Abdominal:      General: Abdomen is flat. Bowel sounds are normal. There is no distension.      Palpations: Abdomen is soft. There is no mass.      Tenderness: There is abdominal tenderness (Generalized epigastric tenderness, no point tenderness). There is no guarding or rebound.   Musculoskeletal:      Cervical back: Neck supple. No tenderness.   Lymphadenopathy:      Cervical: No cervical adenopathy.   Skin:     General: Skin is warm and dry.      Capillary Refill: Capillary refill takes less than 2 seconds.   Neurological:      Mental Status: He is alert and oriented to person, place, and time.   Psychiatric:         Mood and Affect: Mood normal.         Behavior: Behavior normal.            Results for orders placed or performed in visit on 12/17/24 (from the past 24 hours)   CBC with Platelets & Differential    Narrative    The following orders were created for panel order CBC with Platelets & Differential.  Procedure                               Abnormality         Status                     ---------                               -----------         ------                     CBC with platelets and d...[[id number]]                      Final result                 Please view results for these tests on the individual orders.   CBC with platelets and differential   Result Value Ref Range    WBC Count 4.9 4.0 - 11.0 10e3/uL    RBC Count 4.67 4.40 - 5.90 10e6/uL    Hemoglobin 14.3 13.3 - 17.7 g/dL    Hematocrit 43.5 40.0 - 53.0 %    MCV 93 78 - 100 fL    MCH 30.6 26.5 - 33.0 pg    MCHC 32.9 31.5 - 36.5 g/dL    RDW 11.8 10.0 - 15.0 %    Platelet Count 236 150 - 450 10e3/uL    % Neutrophils 42 %    % Lymphocytes 40 %    % Monocytes 11 %    % Eosinophils 6 %    % Basophils 1 %    % Immature Granulocytes 0 %    Absolute Neutrophils 2.1 1.6 - 8.3 10e3/uL    Absolute Lymphocytes 2.0 0.8 - 5.3 10e3/uL    Absolute Monocytes 0.5 0.0 - 1.3 10e3/uL    Absolute Eosinophils 0.3 0.0 -  0.7 10e3/uL    Absolute Basophils 0.0 0.0 - 0.2 10e3/uL    Absolute Immature Granulocytes 0.0 <=0.4 10e3/uL           Signed Electronically by: KIM Murillo CNP

## 2024-12-18 ENCOUNTER — APPOINTMENT (OUTPATIENT)
Dept: ULTRASOUND IMAGING | Facility: CLINIC | Age: 54
End: 2024-12-18
Attending: EMERGENCY MEDICINE
Payer: COMMERCIAL

## 2024-12-18 ENCOUNTER — DOCUMENTATION ONLY (OUTPATIENT)
Dept: PEDIATRICS | Facility: CLINIC | Age: 54
End: 2024-12-18
Payer: COMMERCIAL

## 2024-12-18 ENCOUNTER — HOSPITAL ENCOUNTER (EMERGENCY)
Facility: CLINIC | Age: 54
Discharge: HOME | End: 2024-12-18
Attending: EMERGENCY MEDICINE
Payer: COMMERCIAL

## 2024-12-18 ENCOUNTER — APPOINTMENT (OUTPATIENT)
Dept: CT IMAGING | Facility: CLINIC | Age: 54
End: 2024-12-18
Attending: EMERGENCY MEDICINE
Payer: COMMERCIAL

## 2024-12-18 ENCOUNTER — TELEPHONE (OUTPATIENT)
Dept: INTERNAL MEDICINE | Facility: CLINIC | Age: 54
End: 2024-12-18
Payer: COMMERCIAL

## 2024-12-18 VITALS
WEIGHT: 182 LBS | DIASTOLIC BLOOD PRESSURE: 68 MMHG | OXYGEN SATURATION: 97 % | HEART RATE: 57 BPM | RESPIRATION RATE: 16 BRPM | SYSTOLIC BLOOD PRESSURE: 114 MMHG | BODY MASS INDEX: 26.96 KG/M2 | TEMPERATURE: 97.7 F | HEIGHT: 69 IN

## 2024-12-18 DIAGNOSIS — R11.0 NAUSEA: ICD-10-CM

## 2024-12-18 DIAGNOSIS — R74.8 ELEVATED LIPASE: ICD-10-CM

## 2024-12-18 LAB
ALBUMIN SERPL BCG-MCNC: 4.2 G/DL (ref 3.5–5.2)
ALP SERPL-CCNC: 94 U/L (ref 40–150)
ALT SERPL W P-5'-P-CCNC: 28 U/L (ref 0–70)
ANION GAP SERPL CALCULATED.3IONS-SCNC: 10 MMOL/L (ref 7–15)
AST SERPL W P-5'-P-CCNC: 22 U/L (ref 0–45)
BASOPHILS # BLD AUTO: 0 10E3/UL (ref 0–0.2)
BASOPHILS NFR BLD AUTO: 1 %
BILIRUB DIRECT SERPL-MCNC: <0.2 MG/DL (ref 0–0.3)
BILIRUB SERPL-MCNC: 0.7 MG/DL
BUN SERPL-MCNC: 10.6 MG/DL (ref 6–20)
CALCIUM SERPL-MCNC: 9.4 MG/DL (ref 8.8–10.4)
CHLORIDE SERPL-SCNC: 104 MMOL/L (ref 98–107)
CREAT SERPL-MCNC: 1.31 MG/DL (ref 0.67–1.17)
EGFRCR SERPLBLD CKD-EPI 2021: 65 ML/MIN/1.73M2
EOSINOPHIL # BLD AUTO: 0.2 10E3/UL (ref 0–0.7)
EOSINOPHIL NFR BLD AUTO: 3 %
ERYTHROCYTE [DISTWIDTH] IN BLOOD BY AUTOMATED COUNT: 12 % (ref 10–15)
GLUCOSE SERPL-MCNC: 94 MG/DL (ref 70–99)
HCO3 SERPL-SCNC: 27 MMOL/L (ref 22–29)
HCT VFR BLD AUTO: 42.3 % (ref 40–53)
HGB BLD-MCNC: 14.4 G/DL (ref 13.3–17.7)
IMM GRANULOCYTES # BLD: 0 10E3/UL
IMM GRANULOCYTES NFR BLD: 0 %
LIPASE SERPL-CCNC: 171 U/L (ref 13–60)
LYMPHOCYTES # BLD AUTO: 2.3 10E3/UL (ref 0.8–5.3)
LYMPHOCYTES NFR BLD AUTO: 33 %
MCH RBC QN AUTO: 31.6 PG (ref 26.5–33)
MCHC RBC AUTO-ENTMCNC: 34 G/DL (ref 31.5–36.5)
MCV RBC AUTO: 93 FL (ref 78–100)
MONOCYTES # BLD AUTO: 0.8 10E3/UL (ref 0–1.3)
MONOCYTES NFR BLD AUTO: 12 %
NEUTROPHILS # BLD AUTO: 3.5 10E3/UL (ref 1.6–8.3)
NEUTROPHILS NFR BLD AUTO: 51 %
NRBC # BLD AUTO: 0 10E3/UL
NRBC BLD AUTO-RTO: 0 /100
PLATELET # BLD AUTO: 225 10E3/UL (ref 150–450)
POTASSIUM SERPL-SCNC: 4.2 MMOL/L (ref 3.4–5.3)
PROT SERPL-MCNC: 6.7 G/DL (ref 6.4–8.3)
RBC # BLD AUTO: 4.55 10E6/UL (ref 4.4–5.9)
SODIUM SERPL-SCNC: 141 MMOL/L (ref 135–145)
WBC # BLD AUTO: 6.8 10E3/UL (ref 4–11)

## 2024-12-18 PROCEDURE — 85004 AUTOMATED DIFF WBC COUNT: CPT | Performed by: EMERGENCY MEDICINE

## 2024-12-18 PROCEDURE — 85041 AUTOMATED RBC COUNT: CPT | Performed by: EMERGENCY MEDICINE

## 2024-12-18 PROCEDURE — 87338 HPYLORI STOOL AG IA: CPT | Performed by: NURSE PRACTITIONER

## 2024-12-18 PROCEDURE — 80053 COMPREHEN METABOLIC PANEL: CPT | Performed by: EMERGENCY MEDICINE

## 2024-12-18 PROCEDURE — 76705 ECHO EXAM OF ABDOMEN: CPT

## 2024-12-18 PROCEDURE — 82248 BILIRUBIN DIRECT: CPT | Performed by: EMERGENCY MEDICINE

## 2024-12-18 PROCEDURE — 250N000011 HC RX IP 250 OP 636: Performed by: EMERGENCY MEDICINE

## 2024-12-18 PROCEDURE — 74177 CT ABD & PELVIS W/CONTRAST: CPT

## 2024-12-18 PROCEDURE — 83690 ASSAY OF LIPASE: CPT | Performed by: EMERGENCY MEDICINE

## 2024-12-18 PROCEDURE — 36415 COLL VENOUS BLD VENIPUNCTURE: CPT | Performed by: EMERGENCY MEDICINE

## 2024-12-18 PROCEDURE — 96374 THER/PROPH/DIAG INJ IV PUSH: CPT | Mod: 59

## 2024-12-18 PROCEDURE — 99285 EMERGENCY DEPT VISIT HI MDM: CPT | Mod: 25

## 2024-12-18 RX ORDER — ONDANSETRON 4 MG/1
4 TABLET, ORALLY DISINTEGRATING ORAL EVERY 8 HOURS PRN
Qty: 10 TABLET | Refills: 0 | Status: SHIPPED | OUTPATIENT
Start: 2024-12-18 | End: 2024-12-21

## 2024-12-18 RX ORDER — IOPAMIDOL 755 MG/ML
90 INJECTION, SOLUTION INTRAVASCULAR ONCE
Status: COMPLETED | OUTPATIENT
Start: 2024-12-18 | End: 2024-12-18

## 2024-12-18 RX ORDER — ONDANSETRON 2 MG/ML
4 INJECTION INTRAMUSCULAR; INTRAVENOUS ONCE
Status: COMPLETED | OUTPATIENT
Start: 2024-12-18 | End: 2024-12-18

## 2024-12-18 RX ADMIN — ONDANSETRON 4 MG: 2 INJECTION, SOLUTION INTRAMUSCULAR; INTRAVENOUS at 11:48

## 2024-12-18 RX ADMIN — IOPAMIDOL 90 ML: 755 INJECTION, SOLUTION INTRAVENOUS at 13:39

## 2024-12-18 ASSESSMENT — COLUMBIA-SUICIDE SEVERITY RATING SCALE - C-SSRS
1. IN THE PAST MONTH, HAVE YOU WISHED YOU WERE DEAD OR WISHED YOU COULD GO TO SLEEP AND NOT WAKE UP?: NO
2. HAVE YOU ACTUALLY HAD ANY THOUGHTS OF KILLING YOURSELF IN THE PAST MONTH?: NO
6. HAVE YOU EVER DONE ANYTHING, STARTED TO DO ANYTHING, OR PREPARED TO DO ANYTHING TO END YOUR LIFE?: NO

## 2024-12-18 NOTE — TELEPHONE ENCOUNTER
RN reached out to [facility] per provider result note from labs completed yesterday.    Devi @ [facility] states provider is reviewing 2 other referrals and they will return call once provider has had a chance to review patient's chart further.     RN called patient - who received call from provider during phone call. ADS reviewed chart and recommended ED assessment due to elevated Lipase level.

## 2024-12-18 NOTE — TELEPHONE ENCOUNTER
See documentation only encounter from today, ADS recommends that patient is seen in the ED for this concern.

## 2024-12-18 NOTE — ED PROVIDER NOTES
EMERGENCY DEPARTMENT ENCOUNTER      NAME: Magen Cabral  AGE: 54 year old male  YOB: 1970  MRN: [id number]  EVALUATION DATE & TIME: No admission date for patient encounter.    PCP: Magdi Sanchez    ED PROVIDER: Joe Huber D.O.      Chief Complaint   Patient presents with    Abnormal Labs    Nausea       FINAL IMPRESSION:  1. Elevated lipase    2. Nausea        ED COURSE & MEDICAL DECISION MAKIN:05 AM I met with the patient to gather history and to perform my initial exam. I discussed the plan for care while in the Emergency Department.  1:09 PM I rechecked and updated the patient on results. He is agreeable to CT abdomen.            Pertinent Labs & Imaging studies reviewed. (See chart for details)  54 year old male presents to the Emergency Department for evaluation of nausea with elevated lipase at his primary care clinic yesterday.  He was Emergency Department today but no significant abdominal pain.  Initial concern for pancreatitis versus colitis versus viral etiology versus other acute process.  Today his lipase is downtrending.  Right upper quadrant ultrasound was performed there is no evidence of biliary obstruction.  There was some noted polyps on  the ultrasound, but these do not require follow-up per radiology.  CT imaging was then performed there is no evidence of abnormality of the pancreas.  He does have some nonspecific changes of the distal colon, and I have made the patient aware of this.  At this time, as he is down to oral intake, and has had no significant pain, with a downtrending lipase believe the patient be safely discharged home with outpatient follow-up with primary care provider for repeat testing.  Patient was comfortable this plan.  Return precautions were discussed    Medical Decision Making  Obtained supplemental history:Supplemental history obtained?: No  Reviewed external records: External records reviewed?: Documented in chart and Outpatient Record:  [facility] 12/17/2024 for abdominal pain  Care impacted by chronic illness:Documented in Chart  Did you consider but not order tests?: Work up considered but not performed and documented in chart, if applicable  Did you interpret images independently?: Independent interpretation of ECG and images noted in documentation, when applicable.  Consultation discussion with other provider:Did you involve another provider (consultant, , pharmacy, etc.)?: No  Discharge. I prescribed additional prescription strength medication(s) as charted. See documentation for any additional details.    MIPS: Not Applicable        At the conclusion of the encounter I discussed the results of all of the tests and the disposition. The questions were answered. The patient or family acknowledged understanding and was agreeable with the care plan.        HPI    Patient information was obtained from: the patient     Use of : N/A        Magen Cabral is a 54 year old male who presents for evaluation of abnormal labs.     The patient reports going to the clinic yesterday after vomiting on the flight back [place]. His lipase was 600 so he was advised to present to the ED to rule out pancreatitis. Last emesis was 3 days ago. The patient denies abdominal pain. Denies heavy alcohol use but states he used to.     No known allergies.     The patient denies fever and any other complaints at this time.      Per Chart Review: the patient presented to [facility] yesterday (12/17) for abdominal pain. Patient was having epigastric pain prior to his [place] trip. He states it felt similar to his history of H. Pylori infection. Lipase 617. Recommend ED evaluation for acute pancreatitis       PAST MEDICAL HISTORY:  History reviewed. No pertinent past medical history.    PAST SURGICAL HISTORY:  Past Surgical History:   Procedure Laterality Date    KNEE SURGERY           CURRENT MEDICATIONS:    No current  facility-administered medications for this encounter.     Current Outpatient Medications   Medication Sig Dispense Refill    ondansetron (ZOFRAN ODT) 4 MG ODT tab Take 1 tablet (4 mg) by mouth every 8 hours as needed for nausea or vomiting. 10 tablet 0    [employer] LIDOCAINE PAIN RELIEF 4 % Patch       omeprazole (PRILOSEC) 20 MG DR capsule Take 1 capsule (20 mg) by mouth daily. 30 capsule 0         ALLERGIES:  No Known Allergies    FAMILY HISTORY:  Family History   Problem Relation Age of Onset    Diabetes Mother     Dementia Mother     Bipolar Disorder Mother     Diabetes Brother     Bipolar Disorder Brother     Diabetes Brother     Schizophrenia Brother     Stomach Cancer Brother     Colon Cancer Brother     ALS Maternal Grandmother     Asthma Son     Thyroid Disease Daughter        SOCIAL HISTORY:  Social History     Socioeconomic History    Marital status:    Tobacco Use    Smoking status: Never     Passive exposure: Never    Smokeless tobacco: Never   Vaping Use    Vaping status: Never Used   Substance and Sexual Activity    Alcohol use: Yes     Comment: Alcoholic Drinks/day: Occasionally    Drug use: No    Sexual activity: Yes     Partners: Female     Social Drivers of Health     Financial Resource Strain: Low Risk  (10/30/2023)    Financial Resource Strain     Within the past 12 months, have you or your family members you live with been unable to get utilities (heat, electricity) when it was really needed?: No   Food Insecurity: Low Risk  (10/30/2023)    Food Insecurity     Within the past 12 months, did you worry that your food would run out before you got money to buy more?: No     Within the past 12 months, did the food you bought just not last and you didn t have money to get more?: No   Transportation Needs: Low Risk  (10/30/2023)    Transportation Needs     Within the past 12 months, has lack of transportation kept you from medical appointments, getting your medicines, non-medical meetings or  "appointments, work, or from getting things that you need?: No   Interpersonal Safety: Low Risk  (10/30/2023)    Interpersonal Safety     Do you feel physically and emotionally safe where you currently live?: Yes     Within the past 12 months, have you been hit, slapped, kicked or otherwise physically hurt by someone?: No     Within the past 12 months, have you been humiliated or emotionally abused in other ways by your partner or ex-partner?: No   Housing Stability: Low Risk  (10/30/2023)    Housing Stability     Do you have housing? : Yes     Are you worried about losing your housing?: No       VITALS:  Patient Vitals for the past 24 hrs:   BP Temp Temp src Pulse Resp SpO2 Height Weight   12/18/24 1105 115/68 97.7  F (36.5  C) Oral 58 16 99 % 1.753 m (5' 9\") 82.6 kg (182 lb)       PHYSICAL EXAM    VITAL SIGNS: /68   Pulse 58   Temp 97.7  F (36.5  C) (Oral)   Resp 16   Ht 1.753 m (5' 9\")   Wt 82.6 kg (182 lb)   SpO2 99%   BMI 26.88 kg/m      General Appearance: Well-appearing, well-nourished, no acute distress   Head:  Normocephalic, without obvious abnormality, atraumatic  Eyes:  PERRL, conjunctiva/corneas clear, EOM's intact,  ENT:  Lips, mucosa, and tongue normal, membranes are moist without pallor  Neck:  Normal ROM, symmetrical, trachea midline    Cardio:  Regular rate and rhythm, no murmur, rub or gallop, 2+ pulses symmetric in all extremities  Pulm:  Clear to auscultation bilaterally, respirations unlabored,  Abdomen:  Soft, non-tender, no rebound or guarding.  Musculoskeletal: Full ROM, no edema, no cyanosis, good ROM of major joints  Integument:  Warm, Dry, No erythema, No rash.    Neurologic:  Alert & oriented.  No focal deficits appreciated.    Psychiatric:  Affect normal, Judgment normal, Mood normal.      LABS  Results for orders placed or performed during the hospital encounter of 12/18/24 (from the past 24 hours)   CBC with platelets + differential    Narrative    The following orders " were created for panel order CBC with platelets + differential.  Procedure                               Abnormality         Status                     ---------                               -----------         ------                     CBC with platelets and d...[[id number]]                      Final result                 Please view results for these tests on the individual orders.   Basic metabolic panel   Result Value Ref Range    Sodium 141 135 - 145 mmol/L    Potassium 4.2 3.4 - 5.3 mmol/L    Chloride 104 98 - 107 mmol/L    Carbon Dioxide (CO2) 27 22 - 29 mmol/L    Anion Gap 10 7 - 15 mmol/L    Urea Nitrogen 10.6 6.0 - 20.0 mg/dL    Creatinine 1.31 (H) 0.67 - 1.17 mg/dL    GFR Estimate 65 >60 mL/min/1.73m2    Calcium 9.4 8.8 - 10.4 mg/dL    Glucose 94 70 - 99 mg/dL   Hepatic function panel   Result Value Ref Range    Protein Total 6.7 6.4 - 8.3 g/dL    Albumin 4.2 3.5 - 5.2 g/dL    Bilirubin Total 0.7 <=1.2 mg/dL    Alkaline Phosphatase 94 40 - 150 U/L    AST 22 0 - 45 U/L    ALT 28 0 - 70 U/L    Bilirubin Direct <0.20 0.00 - 0.30 mg/dL   Lipase   Result Value Ref Range    Lipase 171 (H) 13 - 60 U/L   CBC with platelets and differential   Result Value Ref Range    WBC Count 6.8 4.0 - 11.0 10e3/uL    RBC Count 4.55 4.40 - 5.90 10e6/uL    Hemoglobin 14.4 13.3 - 17.7 g/dL    Hematocrit 42.3 40.0 - 53.0 %    MCV 93 78 - 100 fL    MCH 31.6 26.5 - 33.0 pg    MCHC 34.0 31.5 - 36.5 g/dL    RDW 12.0 10.0 - 15.0 %    Platelet Count 225 150 - 450 10e3/uL    % Neutrophils 51 %    % Lymphocytes 33 %    % Monocytes 12 %    % Eosinophils 3 %    % Basophils 1 %    % Immature Granulocytes 0 %    NRBCs per 100 WBC 0 <1 /100    Absolute Neutrophils 3.5 1.6 - 8.3 10e3/uL    Absolute Lymphocytes 2.3 0.8 - 5.3 10e3/uL    Absolute Monocytes 0.8 0.0 - 1.3 10e3/uL    Absolute Eosinophils 0.2 0.0 - 0.7 10e3/uL    Absolute Basophils 0.0 0.0 - 0.2 10e3/uL    Absolute Immature Granulocytes 0.0 <=0.4 10e3/uL    Absolute NRBCs 0.0  10e3/uL   US Abdomen Limited    Narrative    EXAM: US ABDOMEN LIMITED  LOCATION: [facility]  DATE: 12/18/2024    INDICATION: Elevated lipase as outpatient, rule out biliary obstruction  COMPARISON: None.  TECHNIQUE: Limited abdominal ultrasound.    FINDINGS:    GALLBLADDER: Nondistended gallbladder. Normal gallbladder wall thickness of 2 mm. There are a few nonmobile exophytic structures arising from the gallbladder wall largest of which is 4 x 5 mm consistent with gallbladder polyps.    BILE DUCTS: No biliary dilatation. The common duct measures 5 mm.    LIVER: Normal parenchyma with smooth contour. No focal mass.    RIGHT KIDNEY: No hydronephrosis.    PANCREAS: The visualized portions are normal.    No ascites.      Impression    IMPRESSION:    1.  No bile duct enlargement.  2.  Several nonmobile structures along the gallbladder wall largest of which is 4 x 5 mm consistent with gallbladder polyps. Polyps of the size/morphology do not require imaging workup or follow-up.    REFERENCE:  Management of Incidentally Detected Gallbladder Polyps: Society of Radiologists in Ultrasound Consensus Conference Recommendations. Radiology 2022; 000:1-12    Gallbladder polyp <= 6 mm: No follow-up   CT Abdomen Pelvis w Contrast    Narrative    EXAM: CT ABDOMEN PELVIS W CONTRAST  LOCATION: [facility]  DATE: 12/18/2024    INDICATION: Upper abdominal pain. Elevated serum lipase  COMPARISON: Today's RUQ US 12/18/2024 and 8/12/2022 CT AP  TECHNIQUE: CT scan of the abdomen and pelvis was performed following injection of IV contrast. Multiplanar reformats were obtained. Dose reduction techniques were used.  CONTRAST: Isovue  370 90mL    FINDINGS:   LOWER CHEST: Visualized lungs are clear. No pleural effusion. Heart size normal with no pericardial effusion.     HEPATOBILIARY: A few small benign hepatic cysts require no follow-up. Liver is otherwise normal.  Gallbladder unremarkable with  no visible stone or sludge material.  No  bile duct dilatation.     PANCREAS: Pancreas is within normal limits with no visible pancreatic parenchymal or peripancreatic inflammatory change. No pancreatic mass or pancreatic ductal dilatation.    SPLEEN: Spleen size normal.    ADRENAL GLANDS: Normal.    KIDNEYS/BLADDER: Nonobstructing 3 mm left kidney stone. Kidneys, ureters and bladder are otherwise normal.    BOWEL: Normal appendix. Mucosal hyperenhancement and mild mural thickening throughout the colon and rectum with relative engorgement of the subtending vasculature in the pericolic and mesorectal fat. Findings indeterminate for normal physiologic   appearance versus a mild nonspecific active/acute proctocolitis. No bowel obstruction. No free air. No free fluid.    LYMPH NODES: No lymphadenopathy.    VASCULATURE: Normal caliber abdominal aorta.   Normal variant circumaortic left renal vein.    PELVIC ORGANS: No pelvic mass or fluid.    MUSCULOSKELETAL: Chronic partial ankylosis right sacroiliac joint. Degenerative changes throughout the spine.      Impression    IMPRESSION:   1.  Mucosal hyperenhancement and mild mural thickening throughout the colon and rectum with relative engorgement of the subtending vasculature in the pericolic and mesorectal fat. Findings indeterminate for normal physiologic appearance versus a mild   nonspecific active/acute proctocolitis.  2.  Pancreas within normal limits.  3.  Nonobstructing 3 mm left kidney stone.          RADIOLOGY  CT Abdomen Pelvis w Contrast   Final Result   IMPRESSION:    1.  Mucosal hyperenhancement and mild mural thickening throughout the colon and rectum with relative engorgement of the subtending vasculature in the pericolic and mesorectal fat. Findings indeterminate for normal physiologic appearance versus a mild    nonspecific active/acute proctocolitis.   2.  Pancreas within normal limits.   3.  Nonobstructing 3 mm left kidney stone.       US Abdomen Limited    Final Result   IMPRESSION:      1.  No bile duct enlargement.   2.  Several nonmobile structures along the gallbladder wall largest of which is 4 x 5 mm consistent with gallbladder polyps. Polyps of the size/morphology do not require imaging workup or follow-up.      REFERENCE:   Management of Incidentally Detected Gallbladder Polyps: Society of Radiologists in Ultrasound Consensus Conference Recommendations. Radiology 2022; 000:1-12      Gallbladder polyp <= 6 mm: No follow-up                 MEDICATIONS GIVEN IN THE EMERGENCY:  Medications   ondansetron (ZOFRAN) injection 4 mg (4 mg Intravenous $Given 12/18/24 1148)   iopamidol (ISOVUE-370) solution 90 mL (90 mLs Intravenous $Given 12/18/24 1339)       NEW PRESCRIPTIONS STARTED AT TODAY'S ER VISIT  New Prescriptions    ONDANSETRON (ZOFRAN ODT) 4 MG ODT TAB    Take 1 tablet (4 mg) by mouth every 8 hours as needed for nausea or vomiting.        I, Emilia Oswald, am serving as a scribe to document services personally performed by Joe Huber D.O., based on my observations and the provider's statements to me.  IJoe D.O., attest that Emilia Oswald is acting in a scribe capacity, has observed my performance of the services and has documented them in accordance with my direction.     Joe Huber D.O.  Emergency Medicine  [facility] EMERGENCY ROOM  [address] [id number]  [phone]  Dept: [phone]       Joe Huber,   12/18/24 7311

## 2024-12-18 NOTE — ED TRIAGE NOTES
Pt with 2 weeks of intermittent upper abdominal pain. Seen by MD yesterday and lipase 600 so sent to ER for CT r/o pancreatitis. +nausea. No pain in triage.     Triage Assessment (Adult)       Row Name 12/18/24 6213          Triage Assessment    Airway WDL WDL        Respiratory WDL    Respiratory WDL WDL        Skin Circulation/Temperature WDL    Skin Circulation/Temperature WDL WDL        Cardiac WDL    Cardiac WDL WDL        Peripheral/Neurovascular WDL    Peripheral Neurovascular WDL WDL        Cognitive/Neuro/Behavioral WDL    Cognitive/Neuro/Behavioral WDL WDL

## 2024-12-18 NOTE — PROGRESS NOTES
Received referral from Marjorie Garrison regarding accepting this patient at the [facility] for CT abdomen/pelvis. He presented to her yesterday with epigastric pain and nausea. Labs showed significant lipase elevation, more than 3x the upper limit of normal, therefore highly suggestive of acute pancreatitis. I recommended the patient go to the ER for admission as treatment of pancreatitis requires IV fluids, pain control, and nutrition and even if I got a CT scan and it was normal, his presentation is still consistent with acute pancreatitis.     The [facility] continues to be available for further consults and questions should the situation change.     Bella Winter PA-C

## 2024-12-19 ENCOUNTER — PATIENT OUTREACH (OUTPATIENT)
Dept: CARE COORDINATION | Facility: CLINIC | Age: 54
End: 2024-12-19
Payer: COMMERCIAL

## 2024-12-19 LAB — H PYLORI AG STL QL IA: NEGATIVE

## 2024-12-19 NOTE — LETTER
Magen Cabral  [address] [place] [address]    Dear Magen Cabral,      I am a team member within the [facility] Care Resource Center with [facility]. I recently contacted you to ensure you are doing well following a visit within our health system. I also wanted to take this chance to introduce Clinic Care Coordination should you have any interest in this program in the future.    Below is a description of Clinic Care Coordination and how this team can further assist you:       The Clinic Care Coordination team is made up of a Registered Nurse, , Financial Resource Worker, and a Community Health Worker who understand and can help navigate the health care system. The goal of clinic care coordination is to help you manage your health, improve access to care, and achieve optimal health outcomes. They work alongside your provider to assist you in determining your health and social needs, obtain health care and community resources, and provide you with necessary information and education. Clinic Care Coordination can work with you through any barriers and develop a care plan that helps coordinate and strengthen the relationship between you and your care team.    If you wish to connect with the Clinic Care Coordination Team, please let your [facility] Primary Care Provider or Clinic Care Team know and they can place a referral. The Clinic Care Coordination team will then reach out by phone to further support you.    We are focused on providing you with the highest-quality healthcare experience possible.    Sincerely,   Your care team with [facility]'s [address] [place] [address] ([phone]).

## 2024-12-19 NOTE — PROGRESS NOTES
[facility]  Community Health Worker Initial Outreach    CHW Initial Information Gathering:  Referral Source: ED Follow-Up  CHW Additional Questions  If ED/Hospital discharge, follow-up appointment scheduled as recommended?: No  Patient agreeable to assistance with scheduling?: No  Patient declined (specify): Patient would liketo wait on lab results before scheduling ED follow-up  MyChart active?: Yes    Patient accepts CC: No, patient declined at this time. Patient will be sent Care Coordination introduction letter for future reference.       Ni Enriquez  Community Health Worker  [facility] Care Resource [facility]    *Connected Care Resource Team does NOT follow patient ongoing. Referrals are identified based on internal discharge reports and the outreach is to ensure patient has an understanding of their discharge instructions.

## 2024-12-30 ENCOUNTER — TRANSFERRED RECORDS (OUTPATIENT)
Dept: HEALTH INFORMATION MANAGEMENT | Facility: CLINIC | Age: 54
End: 2024-12-30
Payer: COMMERCIAL

## 2025-01-09 DIAGNOSIS — R10.13 EPIGASTRIC PAIN: ICD-10-CM

## 2025-02-04 ENCOUNTER — TRANSFERRED RECORDS (OUTPATIENT)
Dept: HEALTH INFORMATION MANAGEMENT | Facility: CLINIC | Age: 55
End: 2025-02-04
Payer: COMMERCIAL

## 2025-02-04 LAB
ALT SERPL-CCNC: 41 IU/L (ref 0–44)
AST SERPL-CCNC: 23 IU/L (ref 0–40)

## 2025-02-12 ENCOUNTER — HOSPITAL ENCOUNTER (OUTPATIENT)
Dept: NUCLEAR MEDICINE | Facility: CLINIC | Age: 55
Discharge: HOME | End: 2025-02-12
Attending: INTERNAL MEDICINE
Payer: COMMERCIAL

## 2025-02-12 DIAGNOSIS — R51.9 LEFT-SIDED HEADACHE: ICD-10-CM

## 2025-02-12 DIAGNOSIS — R39.11 URINARY HESITANCY: ICD-10-CM

## 2025-02-12 DIAGNOSIS — R10.12 LUQ ABDOMINAL PAIN: ICD-10-CM

## 2025-02-12 DIAGNOSIS — R11.0 NAUSEA: ICD-10-CM

## 2025-02-12 PROCEDURE — 343N000001 HC RX 343 MED OP 636: Performed by: INTERNAL MEDICINE

## 2025-02-12 PROCEDURE — 78264 GASTRIC EMPTYING IMG STUDY: CPT

## 2025-02-12 PROCEDURE — A9541 TC99M SULFUR COLLOID: HCPCS | Performed by: INTERNAL MEDICINE

## 2025-02-12 RX ADMIN — Medication 1 MILLICURIE: at 09:10

## 2025-02-15 ENCOUNTER — HOSPITAL ENCOUNTER (OUTPATIENT)
Dept: MRI IMAGING | Facility: CLINIC | Age: 55
Discharge: HOME | End: 2025-02-15
Attending: INTERNAL MEDICINE | Admitting: INTERNAL MEDICINE
Payer: COMMERCIAL

## 2025-02-15 DIAGNOSIS — R39.11 URINARY HESITANCY: ICD-10-CM

## 2025-02-15 DIAGNOSIS — R11.0 NAUSEA: ICD-10-CM

## 2025-02-15 DIAGNOSIS — R51.9 LEFT-SIDED HEADACHE: ICD-10-CM

## 2025-02-15 DIAGNOSIS — R10.12 LUQ ABDOMINAL PAIN: ICD-10-CM

## 2025-02-15 PROCEDURE — 70551 MRI BRAIN STEM W/O DYE: CPT

## 2025-02-19 ENCOUNTER — TRANSFERRED RECORDS (OUTPATIENT)
Dept: HEALTH INFORMATION MANAGEMENT | Facility: CLINIC | Age: 55
End: 2025-02-19
Payer: COMMERCIAL

## 2025-03-12 ENCOUNTER — TRANSFERRED RECORDS (OUTPATIENT)
Dept: HEALTH INFORMATION MANAGEMENT | Facility: CLINIC | Age: 55
End: 2025-03-12
Payer: COMMERCIAL

## 2025-04-14 ENCOUNTER — OFFICE VISIT (OUTPATIENT)
Dept: FAMILY MEDICINE | Facility: CLINIC | Age: 55
End: 2025-04-14
Payer: COMMERCIAL

## 2025-04-14 VITALS
OXYGEN SATURATION: 96 % | RESPIRATION RATE: 16 BRPM | DIASTOLIC BLOOD PRESSURE: 60 MMHG | WEIGHT: 185.6 LBS | HEART RATE: 62 BPM | BODY MASS INDEX: 28.13 KG/M2 | SYSTOLIC BLOOD PRESSURE: 110 MMHG | HEIGHT: 68 IN | TEMPERATURE: 97.8 F

## 2025-04-14 DIAGNOSIS — F33.2 SEVERE EPISODE OF RECURRENT MAJOR DEPRESSIVE DISORDER, WITHOUT PSYCHOTIC FEATURES (H): ICD-10-CM

## 2025-04-14 DIAGNOSIS — Z01.818 PREOP GENERAL PHYSICAL EXAM: Primary | ICD-10-CM

## 2025-04-14 DIAGNOSIS — Z86.718 PERSONAL HISTORY OF DVT (DEEP VEIN THROMBOSIS): ICD-10-CM

## 2025-04-14 DIAGNOSIS — K59.00 CONSTIPATION, UNSPECIFIED CONSTIPATION TYPE: ICD-10-CM

## 2025-04-14 DIAGNOSIS — R11.0 NAUSEA: ICD-10-CM

## 2025-04-14 DIAGNOSIS — N18.31 STAGE 3A CHRONIC KIDNEY DISEASE (CKD) (H): ICD-10-CM

## 2025-04-14 DIAGNOSIS — R10.13 EPIGASTRIC PAIN: ICD-10-CM

## 2025-04-14 DIAGNOSIS — R73.03 PREDIABETES: ICD-10-CM

## 2025-04-14 DIAGNOSIS — F90.0 ADHD, PREDOMINANTLY INATTENTIVE TYPE: ICD-10-CM

## 2025-04-14 DIAGNOSIS — E78.5 HYPERLIPIDEMIA LDL GOAL <100: ICD-10-CM

## 2025-04-14 DIAGNOSIS — N20.0 KIDNEY STONE: ICD-10-CM

## 2025-04-14 PROCEDURE — 3074F SYST BP LT 130 MM HG: CPT | Performed by: NURSE PRACTITIONER

## 2025-04-14 PROCEDURE — 99214 OFFICE O/P EST MOD 30 MIN: CPT | Performed by: NURSE PRACTITIONER

## 2025-04-14 PROCEDURE — 3078F DIAST BP <80 MM HG: CPT | Performed by: NURSE PRACTITIONER

## 2025-04-14 RX ORDER — POLYETHYLENE GLYCOL-3350 AND ELECTROLYTES 236; 6.74; 5.86; 2.97; 22.74 G/274.31G; G/274.31G; G/274.31G; G/274.31G; G/274.31G
POWDER, FOR SOLUTION ORAL
COMMUNITY
Start: 2025-02-13 | End: 2025-04-14

## 2025-04-14 ASSESSMENT — PATIENT HEALTH QUESTIONNAIRE - PHQ9
SUM OF ALL RESPONSES TO PHQ QUESTIONS 1-9: 18
SUM OF ALL RESPONSES TO PHQ QUESTIONS 1-9: 18
10. IF YOU CHECKED OFF ANY PROBLEMS, HOW DIFFICULT HAVE THESE PROBLEMS MADE IT FOR YOU TO DO YOUR WORK, TAKE CARE OF THINGS AT HOME, OR GET ALONG WITH OTHER PEOPLE: SOMEWHAT DIFFICULT

## 2025-04-14 NOTE — PATIENT INSTRUCTIONS
How to Take Your Medication Before Surgery  Preoperative Medication Instructions   Antiplatelet or Anticoagulation Medication Instructions   - We reviewed the medication list and the patient is not on an antiplatelet or anticoagulation medications.    Additional Medication Instructions  We reviewed the medication list and there are no chronic medications that need to be adjusted for this procedure.   - Herbal medications and vitamins: DO NOT TAKE 14 days prior to surgery.       Patient Education   Preparing for Your Surgery  For Adults  Getting started  In most cases, a nurse will call to review your health history and instructions. They will give you an arrival time based on your scheduled surgery time. Please be ready to share:  Your doctor's clinic name and phone number  Your medical, surgical, and anesthesia history  A list of allergies and sensitivities  A list of medicines, including herbal treatments and over-the-counter drugs  Whether the patient has a legal guardian (ask how to send us the papers in advance)  Note: You may not receive a call if you were seen at our PAC (Preoperative Assessment Center).  Please tell us if you're pregnant--or if there's any chance you might be pregnant. Some surgeries may injure a fetus (unborn baby), so they require a pregnancy test. Surgeries that are safe for a fetus don't always need a test, and you can choose whether to have one.   Preparing for surgery  Within 10 to 30 days of surgery: Have a pre-op exam (sometimes called an H&P, or History and Physical). This can be done at a clinic or pre-operative center.  If you're having a , you may not need this exam. Talk to your care team.  At your pre-op exam, talk to your care team about all medicines you take. (This includes CBD oil and any drugs, such as THC, marijuana, and other forms of cannabis.) If you need to stop any medicine before surgery, ask when to start taking it again.  This is for your safety. Many  medicines and drugs can make you bleed too much during surgery. Some change how well surgery (anesthesia) drugs work.  Call your insurance company to let them know you're having surgery. (If you don't have insurance, call [phone].)  Call your clinic if there's any change in your health. This includes a scrape or scratch near the surgery site, or any signs of a cold (sore throat, runny nose, cough, rash, fever).  Eating and drinking guidelines  For your safety: Unless your surgeon tells you otherwise, follow the guidelines below.  Eat and drink as normal until 8 hours before you arrive for surgery. After that, no food or milk. You can spit out gum when you arrive.  Drink clear liquids until 2 hours before you arrive. These are liquids you can see through, like water, Gatorade, and Propel Water. They also include plain black coffee and tea (no cream or milk).  No alcohol for 24 hours before you arrive. The night before surgery, stop any drinks that contain THC.  If your care team tells you to take medicine on the morning of surgery, it's okay to take it with a sip of water. No other medicines or drugs are allowed (including CBD oil)--follow your care team's instructions.  If you have questions the day of surgery, call your hospital or surgery center.   Preventing infection  Shower or bathe the night before and the morning of surgery. Follow the instructions your clinic gave you. (If no instructions, use regular soap.)  Don't shave or clip hair near your surgery site. We'll remove the hair if needed.  Don't smoke or vape the morning of surgery. No chewing tobacco for 6 hours before you arrive. A nicotine patch is okay. You may spit out nicotine gum when you arrive.  For some surgeries, the surgeon will tell you to fully quit smoking and nicotine.  We will make every effort to keep you safe from infection. We will:  Clean our hands often with soap and water (or an alcohol-based hand rub).  Clean the skin at your  surgery site with a special soap that kills germs.  Give you a special gown to keep you warm. (Cold raises the risk of infection.)  Wear hair covers, masks, gowns, and gloves during surgery.  Give antibiotic medicine, if prescribed. Not all surgeries need this medicine.  What to bring on the day of surgery  Photo ID and insurance card  Copy of your health care directive, if you have one  Glasses and hearing aids (bring cases)  You can't wear contacts during surgery  Inhaler and eye drops, if you use them (tell us about these when you arrive)  CPAP machine or breathing device, if you use them  A few personal items, if spending the night  If you have . . .  A pacemaker, ICD (cardiac defibrillator), or other implant: Bring the ID card.  An implanted stimulator: Bring the remote control.  A legal guardian: Bring a copy of the certified (court-stamped) guardianship papers.  Please remove any jewelry, including body piercings. Leave jewelry and other valuables at home.  If you're going home the day of surgery  You must have a responsible adult drive you home. They should stay with you overnight as well.  If you don't have someone to stay with you, and you aren't safe to go home alone, we may keep you overnight. Insurance often won't pay for this.  After surgery  If it's hard to control your pain or you need more pain medicine, please call your surgeon's office.  Questions?   If you have any questions for your care team, list them here:   ____________________________________________________________________________________________________________________________________________________________________________________________________________________________________________________________  For informational purposes only. Not to replace the advice of your health care provider. Copyright   2003, 2019 [facility]. All rights reserved. Clinically reviewed by Ramirez Li MD. [employer] [id number] - REV 08/24.

## 2025-04-14 NOTE — PROGRESS NOTES
Preoperative Evaluation  [facility]  [address] [address]  Phone: [phone]  Fax: [phone]  Primary Provider: Magdi Sanchez CNP  Pre-op Performing Provider: KIM Murillo CNP  Apr 14, 2025 4/14/2025   Surgical Information   What procedure is being done? endoscopy   Facility or Hospital where procedure/surgery will be performed: ?   Who is doing the procedure / surgery? Dr Kaur   Date of surgery / procedure: 04-   Time of surgery / procedure: ?   Where do you plan to recover after surgery? at home with family     Fax number for surgical facility: [phone] [employer]  Assessment & Plan     The proposed surgical procedure is considered LOW risk.    Preop general physical exam  Magen is considered intermediate risk for low risk procedure, cleared to proceed with endoscopy.    Epigastric pain  He has intermittent epigastric pain and nausea with history of elevated lipase.  Workup includes abdominal ultrasound, CT abdomen pelvis, gastric emptying study and screening colonoscopy without findings to explain symptoms.  Negative for H. pylori.  Does not tolerate omeprazole, worsened GI symptoms.  He has history of alcohol use but does not drink alcohol and denies cigarette smoking.  He is cleared to proceed with endoscopy.    Nausea  Cleared for procedure    Prediabetes  BMP on 12/18/2024 with blood glucose within normal limits.    Constipation, unspecified constipation type  Struggles with constipation, discussed could be contributing to his abdominal pain.  Had left lower quadrant abdominal pain over the weekend.    ADHD, predominantly inattentive type  Currently managed without medication    Personal history of DVT (deep vein thrombosis)  History of DVT in 2013 following knee surgery.  No family history of blood clot or bleeding disorder.    Stage 3a chronic kidney disease (CKD) (H)  Recent creatinine 1.31, GFR 65,  stable.    Hyperlipidemia LDL goal <100  Has mildly elevated cholesterol with , statin declined.    Severe episode of recurrent major depressive disorder, without psychotic features (H)  Currently managed without prescribed psychotropic medication.  Does use CBD Gummies every other weekend and working on weaning off of CBD.    Kidney stone   CT pelvis from 12/18/2024 showed nonobstructing 3 mm left side kidney stone.    Risks and Recommendations  The patient has the following additional risks and recommendations for perioperative complications:   - No identified additional risk factors other than previously addressed    Preoperative Medication Instructions  Antiplatelet or Anticoagulation Medication Instructions   - We reviewed the medication list and the patient is not on an antiplatelet or anticoagulation medications.    Additional Medication Instructions  We reviewed the medication list and there are no chronic medications that need to be adjusted for this procedure.   - Herbal medications and vitamins: DO NOT TAKE 14 days prior to surgery.    Recommendation  Approval given to proceed with proposed procedure, without further diagnostic evaluation.    Dorothy Us is a 55 year old, presenting for the following:  Pre-Op Exam          4/14/2025     7:47 AM   Additional Questions   Roomed by Debora CORTES: Epigastric pain, LLQ intermittent, nausea, omeprazole caused GI upset.  [facility] note reviewed, unremarkable gastric emptying and CT abd pelvis except for possibly colitis 12/18/24 4/14/2025   Pre-Op Questionnaire   Have you ever had a heart attack or stroke? No   Have you ever had surgery on your heart or blood vessels, such as a stent placement, a coronary artery bypass, or surgery on an artery in your head, neck, heart, or legs? No   Do you have chest pain with activity? No   Do you have a history of heart failure? No   Do you currently have a cold, bronchitis or symptoms of other infection?  No   Do you have a cough, shortness of breath, or wheezing? No   Do you or anyone in your family have previous history of blood clots? (!) YES hx of dvt in left leg after arthroscopy (2013)    Do you or does anyone in your family have a serious bleeding problem such as prolonged bleeding following surgeries or cuts? No   Have you ever had problems with anemia or been told to take iron pills? No   Have you had any abnormal blood loss such as black, tarry or bloody stools? No   Have you ever had a blood transfusion? No   Are you willing to have a blood transfusion if it is medically needed before, during, or after your surgery? (!) NO confirmed    Have you or any of your relatives ever had problems with anesthesia? No   Do you have sleep apnea, excessive snoring or daytime drowsiness? No   Do you have any artifical heart valves or other implanted medical devices like a pacemaker, defibrillator, or continuous glucose monitor? No   Do you have artificial joints? No   Are you allergic to latex? No     Health Care Directive  Patient does not have a Health Care Directive: Discussed advance care planning with patient; however, patient declined at this time.    Preoperative Review of    reviewed - no record of controlled substances prescribed.          Patient Active Problem List    Diagnosis Date Noted    Major depression, recurrent (H) 09/19/2024     Priority: Medium    Mild major depression (H) 01/09/2019     Priority: Medium    Constipation      Priority: Medium     Created by Conversion  Replacement Utility updated for latest IMO load        Herpes Simplex Type I      Priority: Medium     Created by Conversion  Replacement Utility updated for latest IMO load        Bone Spur Of The Left Olecranon      Priority: Medium     Created by Conversion  Replacement Utility updated for latest IMO load        DVT (deep venous thrombosis), left 10/08/2015     Priority: Medium     Replacement Utility updated for latest IMO  load        Onychomycosis 05/20/2015     Priority: Medium    Gastritis 02/09/2015     Priority: Medium    Abdominal Pain In The Left Lower Belly (LLQ)  01/15/2015     Priority: Medium    ADHD, predominantly inattentive type 01/15/2015     Priority: Medium    Decreased Concentrating Ability 01/15/2015     Priority: Medium    Abdominal Pain In The Central Upper Belly (Epigastric)      Priority: Medium     Created by Conversion        Spinning Dizziness (Vertigo)      Priority: Medium     Created by Conversion        Groin (Inguinal) Pain      Priority: Medium     Created by Conversion        Muscle Weakness Generalized      Priority: Medium     Created by Conversion        Diarrhea      Priority: Medium     Created by Conversion        Colitis      Priority: Medium     Created by Conversion        Tinea Versicolor      Priority: Medium     Created by Conversion        Dysthymic Disorder      Priority: Medium     Created by Conversion          No past medical history on file.  Past Surgical History:   Procedure Laterality Date    KNEE SURGERY       Current Outpatient Medications   Medication Sig Dispense Refill    [employer] LIDOCAINE PAIN RELIEF 4 % Patch       omeprazole (PRILOSEC) 20 MG DR capsule TAKE 1 CAPSULE BY MOUTH EVERY DAY 90 capsule 1    GAVILYTE-G 236 g suspension TAKE BY ORAL ROUTE AS DIRECTED PER COLONOSCOPY PREP INSTRUCTIONS FROM [facility] (Patient not taking: Reported on 4/14/2025)         No Known Allergies     Social History     Tobacco Use    Smoking status: Never     Passive exposure: Never    Smokeless tobacco: Never   Substance Use Topics    Alcohol use: Yes     Comment: Alcoholic Drinks/day: Occasionally     Family History   Problem Relation Age of Onset    Diabetes Mother     Dementia Mother     Bipolar Disorder Mother     Diabetes Brother     Bipolar Disorder Brother     Diabetes Brother     Schizophrenia Brother     Stomach Cancer Brother     Colon Cancer Brother     ALS Maternal Grandmother     Thyroid  "Disease Daughter     Asthma Son     Malignant Hyperthermia No family hx of     Anesthesia Reaction No family hx of     Thrombosis No family hx of      History   Drug Use No             Review of Systems  CONSTITUTIONAL: NEGATIVE for fever, chills, change in weight  INTEGUMENTARY/SKIN: NEGATIVE for worrisome rashes, moles or lesions  EYES: NEGATIVE for vision changes or irritation  ENT/MOUTH: NEGATIVE for ear, mouth and throat problems  RESP: NEGATIVE for significant cough or SOB  BREAST: NEGATIVE for masses, tenderness or discharge  CV: NEGATIVE for chest pain, palpitations or peripheral edema  GI: Positive for nausesa,  intermittent epigastric pain, LLQ pain, constipation  NEG for blood in stool  : NEGATIVE for frequency, dysuria, or hematuria  MUSCULOSKELETAL: NEGATIVE for significant arthralgias or myalgia  NEURO: NEGATIVE for weakness, dizziness or paresthesias  ENDOCRINE: NEGATIVE for temperature intolerance, skin/hair changes  HEME: NEGATIVE for bleeding problems  PSYCHIATRIC: NEGATIVE for changes in mood or affect    Objective    /60 (BP Location: Right arm, Patient Position: Sitting)   Pulse 62   Temp 97.8  F (36.6  C) (Tympanic)   Resp 16   Ht 1.731 m (5' 8.13\")   Wt 84.2 kg (185 lb 9.6 oz)   SpO2 96%   BMI 28.12 kg/m     Estimated body mass index is 28.12 kg/m  as calculated from the following:    Height as of this encounter: 1.731 m (5' 8.13\").    Weight as of this encounter: 84.2 kg (185 lb 9.6 oz).  Physical Exam  Constitutional:       Appearance: Normal appearance. He is not ill-appearing.   Neurological:      Mental Status: He is alert.           9/19/2024     8:13 AM 12/17/2024     7:50 AM 4/14/2025     7:09 AM   PHQ   PHQ-9 Total Score 26 23  18    Q9: Thoughts of better off dead/self-harm past 2 weeks More than half the days Several days Not at all   F/U: Thoughts of suicide or self-harm No No    F/U: Safety concerns No No        Patient-reported       GENERAL: alert and no " distress  EYES: Eyes grossly normal to inspection, PERRL and conjunctivae and sclerae normal  HENT: ear canals and TM's normal, nose and mouth without ulcers or lesions  NECK: no adenopathy, no asymmetry, masses, or scars  RESP: lungs clear to auscultation - no rales, rhonchi or wheezes  CV: regular rate and rhythm, normal S1 S2, no S3 or S4, no murmur, click or rub, no peripheral edema  ABDOMEN: mild tenderness to palpation of left lower quadrant otherwise soft, nontender, no hepatosplenomegaly, no masses and bowel sounds normal  MS: no gross musculoskeletal defects noted, no edema  SKIN: no suspicious lesions or rashes  NEURO: Normal strength and tone, mentation intact and speech normal  PSYCH: mentation appears normal, affect normal/bright    Recent Labs   Lab Test 12/18/24  1147 12/17/24  0902 09/19/24  0927   HGB 14.4 14.3 14.9    236 269    141 141   POTASSIUM 4.2 4.3 3.7   CR 1.31* 1.39* 1.45*   A1C  --   --  5.9*        Diagnostics  No labs were ordered during this visit.   No EKG required for low risk surgery (cataract, skin procedure, breast biopsy, etc).  No EKG required, no history of coronary heart disease, significant arrhythmia, peripheral arterial disease or other structural heart disease.    Revised Cardiac Risk Index (RCRI)  The patient has the following serious cardiovascular risks for perioperative complications:   - No serious cardiac risks = 0 points     RCRI Interpretation: 0 points: Class I (very low risk - 0.4% complication rate)         Signed Electronically by: KIM Murillo CNP  A copy of this evaluation report is provided to the requesting physician.         Answers submitted by the patient for this visit:  Patient Health Questionnaire (Submitted on 4/14/2025)  If you checked off any problems, how difficult have these problems made it for you to do your work, take care of things at home, or get along with other people?: Somewhat difficult  PHQ9 TOTAL SCORE: 18

## 2025-04-28 ENCOUNTER — PATIENT OUTREACH (OUTPATIENT)
Dept: CARE COORDINATION | Facility: CLINIC | Age: 55
End: 2025-04-28
Payer: COMMERCIAL

## 2025-04-29 ENCOUNTER — MEDICAL CORRESPONDENCE (OUTPATIENT)
Dept: HEALTH INFORMATION MANAGEMENT | Facility: CLINIC | Age: 55
End: 2025-04-29
Payer: COMMERCIAL

## 2025-04-29 ENCOUNTER — TRANSCRIBE ORDERS (OUTPATIENT)
Dept: OTHER | Age: 55
End: 2025-04-29

## 2025-04-29 DIAGNOSIS — K80.20 SYMPTOMATIC CHOLELITHIASIS: Primary | ICD-10-CM

## 2025-05-09 ENCOUNTER — HOSPITAL ENCOUNTER (OUTPATIENT)
Facility: AMBULATORY SURGERY CENTER | Age: 55
End: 2025-05-09
Attending: SPECIALIST
Payer: COMMERCIAL

## 2025-05-09 DIAGNOSIS — K80.20 SYMPTOMATIC CHOLELITHIASIS: ICD-10-CM

## 2025-06-04 ENCOUNTER — TRANSFERRED RECORDS (OUTPATIENT)
Dept: ADMINISTRATIVE | Facility: CLINIC | Age: 55
End: 2025-06-04
Payer: COMMERCIAL

## 2025-06-05 NOTE — PROGRESS NOTES
2025        Magen Cabral   [address] [place] [address]      Magen Cabral,  :  1970    Shlomo Us, I received the note from Dr. Sorto.  Keep me posted on how things are going and call with any questions or concerns.      Thank you.    Electronically signed by:  Tigre Garcia MD 2025 04:39 PM  Document generated by:  Tigre Garcia MD  2025  If your provider ordered multiple tests; the results may not become available at the same time.  If multiple test results are received within 14 days of one another, you may receive a duplicate.  cc:  Marjorie Garrison CNP

## 2025-06-13 ENCOUNTER — TRANSFERRED RECORDS (OUTPATIENT)
Dept: HEALTH INFORMATION MANAGEMENT | Facility: CLINIC | Age: 55
End: 2025-06-13

## 2025-06-13 PROCEDURE — 47562 LAPAROSCOPIC CHOLECYSTECTOMY: CPT | Performed by: SPECIALIST

## 2025-06-13 RX ORDER — HYDROCODONE BITARTRATE AND ACETAMINOPHEN 5; 325 MG/1; MG/1
1-2 TABLET ORAL EVERY 6 HOURS PRN
Qty: 18 TABLET | Refills: 0 | Status: SHIPPED | OUTPATIENT
Start: 2025-06-13 | End: 2025-06-16

## 2025-06-13 NOTE — OP NOTE
Operative Note    Name:  Magen Cabral  PCP:  Marjorie Garrison  Procedure Date:  6/13/2025      Procedure(s):  CHOLECYSTECTOMY, LAPAROSCOPIC    Pre-Procedure Diagnosis:  Symptomatic cholelithiasis [K80.20]    Post-Procedure Diagnosis:    same    OR staff:  Surgeon(s):  Vikash Sorto MD      Anesthesia Type:  General    No past medical history on file.    Patient Active Problem List    Diagnosis Date Noted    Symptomatic cholelithiasis 05/09/2025     Priority: Medium    Prediabetes 04/14/2025     Priority: Medium    Stage 3a chronic kidney disease (CKD) (H) 04/14/2025     Priority: Medium    Hyperlipidemia LDL goal <100 04/14/2025     Priority: Medium    Severe episode of recurrent major depressive disorder, without psychotic features (H) 09/19/2024     Priority: Medium    Mild major depression (H) 01/09/2019     Priority: Medium    Constipation      Priority: Medium     Created by Conversion  Replacement Utility updated for latest IMO load        Herpes Simplex Type I      Priority: Medium     Created by Conversion  Replacement Utility updated for latest IMO load        Bone Spur Of The Left Olecranon      Priority: Medium     Created by Conversion  Replacement Utility updated for latest IMO load        DVT (deep venous thrombosis), left 10/08/2015     Priority: Medium     Replacement Utility updated for latest IMO load        Onychomycosis 05/20/2015     Priority: Medium    Gastritis 02/09/2015     Priority: Medium    Abdominal Pain In The Left Lower Belly (LLQ)  01/15/2015     Priority: Medium    ADHD, predominantly inattentive type 01/15/2015     Priority: Medium    Decreased Concentrating Ability 01/15/2015     Priority: Medium    Abdominal Pain In The Central Upper Belly (Epigastric)      Priority: Medium     Created by Conversion        Spinning Dizziness (Vertigo)      Priority: Medium     Created by Conversion        Groin (Inguinal) Pain      Priority: Medium     Created by Conversion        Muscle  Weakness Generalized      Priority: Medium     Created by Conversion        Diarrhea      Priority: Medium     Created by Conversion        Colitis      Priority: Medium     Created by Conversion        Tinea Versicolor      Priority: Medium     Created by Conversion        Dysthymic disorder      Priority: Medium     Created by Conversion           Findings:  Gb re moved    Operative Report:    Consent was obtained.  The patient was taken to the operating room and placed in a supine position.  General anesthesia was administered by the anesthesia staff.  The patient was prepped and draped in a sterile manner.  The briefing and timeout were performed in the usual fashion. An incision was made at the umbilicus and a 5 mm scope and a Visiport was used to obtain access to the peritoneal cavity.  This was done under direct visualization.  The peritoneal cavity was insufflated to a pressure of 15 mmHg of CO2.  Under direct visualization 2-5 mm ports were placed in the subcostal right margin.  One 1.2 cm port was placed in the epigastric under direct visualization.  The gallbladder was retracted superiorly, adhesions were taken down to visualize the infundibular area.  Here the cystic duct and artery were isolated out with sharp and blunt dissection.  The cystic duct and artery were clipped and ligated.  The gallbladder was then removed from the liver bed using electrocautery.  Hemostasis was obtained with electrocautery.  The gallbladder fossa was washed out with normal saline.  20 mL Marcaine was left in the liver bed area.  No bleeding was appreciated.  The trochars and CO2 were removed.  The incisions were then closed with a 4-0 Monocryl suture in a subcuticular fashion.  Steri-Strips and sterile dressings were applied.  10 mL of Marcaine were injected in the skin before leaving the OR.  15 mg of Toradol were given IV before leaving the OR by anesthesia.  Patient was extubated and transferred to the PACU in stable  condition.  All sponge and needle counts were correct.      Estimated Blood Loss: 5 ml    Specimens:    gallbladder       Drains:   none    Complications:    None    Vikash Sorto MD     Date: 6/13/2025  Time: 9:23 AM

## 2025-06-16 ENCOUNTER — TELEPHONE (OUTPATIENT)
Dept: VASCULAR SURGERY | Facility: CLINIC | Age: 55
End: 2025-06-16
Payer: COMMERCIAL

## 2025-06-16 DIAGNOSIS — R11.2 POST-OPERATIVE NAUSEA AND VOMITING: Primary | ICD-10-CM

## 2025-06-16 DIAGNOSIS — Z98.890 POST-OPERATIVE NAUSEA AND VOMITING: Primary | ICD-10-CM

## 2025-06-16 RX ORDER — ONDANSETRON 4 MG/1
4 TABLET, ORALLY DISINTEGRATING ORAL EVERY 8 HOURS PRN
Qty: 16 TABLET | Refills: 0 | Status: SHIPPED | OUTPATIENT
Start: 2025-06-16

## 2025-06-16 NOTE — TELEPHONE ENCOUNTER
"[facility] Post-Op Phone Call                     Surgeon: Vikash Acosta    Date of Surgery: 6/13/25  Surgery: Laparoscopic Cholecystectomy  Discharge Date: 6/13/25    Date/Time Called:   Date: 6/16/2025 Time: 9:31 AM   Attempt: First    Pain Control:  Intensity: Mild (1 - 3)  Duration/Location/Explain: pain managed with tylenol and ibuprofen  What makes it better/worse?     Medications:  Narcotic Use - No  Drug type:   Frequency:     Incisions:  Drainage? clean and dry  Any fever type symptoms? No  Comment:     GI:  Nausea? Yes  Vomiting? Yes  BM? Yes  Gas? Yes  Voiding Frequency? 4 or more/day   Appetite? Fair    Activity:  Walking activity? Yes  Frequency/Type: as tolerated  Restrictions: No lifting over 20 pounds and no strenuous physical activity for 1 weeks      Post-op appointment made? Yes, requested a visit with DAYAMI to discuss ongoing symptoms including \"feeling hot\" after eating and his ongoing N/V, denies fever/chills        Thank you for your time. Please do not hesitate to call us with any questions or concerns.    Call completed by: Kemi Patton RN    "

## 2025-06-20 ENCOUNTER — OFFICE VISIT (OUTPATIENT)
Dept: SURGERY | Facility: CLINIC | Age: 55
End: 2025-06-20
Payer: COMMERCIAL

## 2025-06-20 DIAGNOSIS — K80.20 SYMPTOMATIC CHOLELITHIASIS: Primary | ICD-10-CM

## 2025-06-20 PROCEDURE — 99024 POSTOP FOLLOW-UP VISIT: CPT | Performed by: SPECIALIST

## 2025-06-20 RX ORDER — ACETAMINOPHEN 325 MG/1
325-650 TABLET ORAL EVERY 6 HOURS PRN
COMMUNITY

## 2025-06-20 RX ORDER — IBUPROFEN 200 MG
200 TABLET ORAL EVERY 4 HOURS PRN
COMMUNITY

## 2025-06-26 NOTE — PROGRESS NOTES
HPI: Pt is here for follow up of a cholecystectomy.  He is doing well. His appetite is good, and bowel function regular.  No fevers or chills. Ambulating without problems.       There were no vitals taken for this visit.      EXAM: This is a  55 year old MAN in no distress  GENERAL: Appears well  CHEST clear  CVS S1S2 NSR  ABDOMEN: Soft, non-tender.  No signs of infection or issue.  EXT: warm, moves without difficulty         Assessment/Plan:   Status post lap yue doing well discussed and answered all questions today.  From my standpoint he return to normal activities normal diet probably if he could wait a few more over another week or so for very but a lot of fats back in his diet would be good discussed this and answered questions and we will see him back as needed      Vikash Sorto MD ,MD  [facility] Department of Surgery